# Patient Record
Sex: MALE | Race: BLACK OR AFRICAN AMERICAN | NOT HISPANIC OR LATINO | URBAN - METROPOLITAN AREA
[De-identification: names, ages, dates, MRNs, and addresses within clinical notes are randomized per-mention and may not be internally consistent; named-entity substitution may affect disease eponyms.]

---

## 2020-02-19 ENCOUNTER — OUTPATIENT (OUTPATIENT)
Dept: OUTPATIENT SERVICES | Facility: HOSPITAL | Age: 75
LOS: 1 days | End: 2020-02-19
Payer: MEDICARE

## 2020-02-19 VITALS
OXYGEN SATURATION: 97 % | DIASTOLIC BLOOD PRESSURE: 75 MMHG | WEIGHT: 242.29 LBS | TEMPERATURE: 98 F | HEIGHT: 72 IN | SYSTOLIC BLOOD PRESSURE: 159 MMHG | HEART RATE: 56 BPM | RESPIRATION RATE: 14 BRPM

## 2020-02-19 DIAGNOSIS — Z96.641 PRESENCE OF RIGHT ARTIFICIAL HIP JOINT: Chronic | ICD-10-CM

## 2020-02-19 DIAGNOSIS — I10 ESSENTIAL (PRIMARY) HYPERTENSION: ICD-10-CM

## 2020-02-19 DIAGNOSIS — Z29.9 ENCOUNTER FOR PROPHYLACTIC MEASURES, UNSPECIFIED: ICD-10-CM

## 2020-02-19 DIAGNOSIS — Z01.818 ENCOUNTER FOR OTHER PREPROCEDURAL EXAMINATION: ICD-10-CM

## 2020-02-19 DIAGNOSIS — I65.23 OCCLUSION AND STENOSIS OF BILATERAL CAROTID ARTERIES: ICD-10-CM

## 2020-02-19 DIAGNOSIS — Z96.642 PRESENCE OF LEFT ARTIFICIAL HIP JOINT: Chronic | ICD-10-CM

## 2020-02-19 DIAGNOSIS — I65.22 OCCLUSION AND STENOSIS OF LEFT CAROTID ARTERY: ICD-10-CM

## 2020-02-19 LAB
ANION GAP SERPL CALC-SCNC: 13 MMOL/L — SIGNIFICANT CHANGE UP (ref 5–17)
BLD GP AB SCN SERPL QL: NEGATIVE — SIGNIFICANT CHANGE UP
BUN SERPL-MCNC: 14 MG/DL — SIGNIFICANT CHANGE UP (ref 7–23)
CALCIUM SERPL-MCNC: 9.5 MG/DL — SIGNIFICANT CHANGE UP (ref 8.4–10.5)
CHLORIDE SERPL-SCNC: 102 MMOL/L — SIGNIFICANT CHANGE UP (ref 96–108)
CO2 SERPL-SCNC: 25 MMOL/L — SIGNIFICANT CHANGE UP (ref 22–31)
CREAT SERPL-MCNC: 1.01 MG/DL — SIGNIFICANT CHANGE UP (ref 0.5–1.3)
GLUCOSE SERPL-MCNC: 100 MG/DL — HIGH (ref 70–99)
HCT VFR BLD CALC: 39.8 % — SIGNIFICANT CHANGE UP (ref 39–50)
HGB BLD-MCNC: 12.8 G/DL — LOW (ref 13–17)
MCHC RBC-ENTMCNC: 28 PG — SIGNIFICANT CHANGE UP (ref 27–34)
MCHC RBC-ENTMCNC: 32.2 GM/DL — SIGNIFICANT CHANGE UP (ref 32–36)
MCV RBC AUTO: 87.1 FL — SIGNIFICANT CHANGE UP (ref 80–100)
NRBC # BLD: 0 /100 WBCS — SIGNIFICANT CHANGE UP (ref 0–0)
PLATELET # BLD AUTO: 198 K/UL — SIGNIFICANT CHANGE UP (ref 150–400)
POTASSIUM SERPL-MCNC: 4.5 MMOL/L — SIGNIFICANT CHANGE UP (ref 3.5–5.3)
POTASSIUM SERPL-SCNC: 4.5 MMOL/L — SIGNIFICANT CHANGE UP (ref 3.5–5.3)
RBC # BLD: 4.57 M/UL — SIGNIFICANT CHANGE UP (ref 4.2–5.8)
RBC # FLD: 13.6 % — SIGNIFICANT CHANGE UP (ref 10.3–14.5)
RH IG SCN BLD-IMP: POSITIVE — SIGNIFICANT CHANGE UP
SODIUM SERPL-SCNC: 140 MMOL/L — SIGNIFICANT CHANGE UP (ref 135–145)
WBC # BLD: 10.2 K/UL — SIGNIFICANT CHANGE UP (ref 3.8–10.5)
WBC # FLD AUTO: 10.2 K/UL — SIGNIFICANT CHANGE UP (ref 3.8–10.5)

## 2020-02-19 PROCEDURE — 86901 BLOOD TYPING SEROLOGIC RH(D): CPT

## 2020-02-19 PROCEDURE — G0463: CPT

## 2020-02-19 PROCEDURE — 80048 BASIC METABOLIC PNL TOTAL CA: CPT

## 2020-02-19 PROCEDURE — 86900 BLOOD TYPING SEROLOGIC ABO: CPT

## 2020-02-19 PROCEDURE — 85027 COMPLETE CBC AUTOMATED: CPT

## 2020-02-19 PROCEDURE — 86850 RBC ANTIBODY SCREEN: CPT

## 2020-02-19 NOTE — H&P PST ADULT - RS GEN PE MLT RESP DETAILS PC
breath sounds equal/no wheezes/no rhonchi/no rales/clear to auscultation bilaterally/good air movement/respirations non-labored/airway patent

## 2020-02-19 NOTE — H&P PST ADULT - ASSESSMENT
CAPRINI SCORE [CLOT updated 18]    AGE RELATED RISK FACTORS                                                       MOBILITY RELATED FACTORS  [ ] Age 41-60 years                                            (1 Point)                    [ ] Bed rest                                                        (1 Point)  [ ] Age: 61-74 years                                           (2 Points)                  [ ] Plaster cast                                                   (2 Points)  [ ] Age= 75 years                                              (3 Points)                    [ ] Bed bound for more than 72 hours                 (2 Points)    DISEASE RELATED RISK FACTORS                                               GENDER SPECIFIC FACTORS  [ ] Edema in the lower extremities                       (1 Point)              [ ] Pregnancy                                                     (1 Point)  [ ] Varicose veins                                               (1 Point)                     [ ] Post-partum < 6 weeks                                   (1 Point)             [ ] BMI > 25 Kg/m2                                            (1 Point)                     [ ] Hormonal therapy  or oral contraception          (1 Point)                 [ ] Sepsis (in the previous month)                        (1 Point)               [ ] History of pregnancy complications                 (1 point)  [ ] Pneumonia or serious lung disease                                               [ ] Unexplained or recurrent                     (1 Point)           (in the previous month)                               (1 Point)  [ ] Abnormal pulmonary function test                     (1 Point)                 SURGERY RELATED RISK FACTORS  [ ] Acute myocardial infarction                              (1 Point)               [ ]  Section                                             (1 Point)  [ ] Congestive heart failure (in the previous month)  (1 Point)      [ ] Minor surgery                                                  (1 Point)   [ ] Inflammatory bowel disease                             (1 Point)               [ ] Arthroscopic surgery                                        (2 Points)  [ ] Central venous access                                      (2 Points)                [ ] General surgery lasting more than 45 minutes (2 points)  [ ] Present or previous malignancy                     (2 Points)                [ ] Elective arthroplasty                                         (5 points)    [ ] Stroke (in the previous month)                          (5 Points)                                                                                                                                                           HEMATOLOGY RELATED FACTORS                                                 TRAUMA RELATED RISK FACTORS  [ ] Prior episodes of VTE                                     (3 Points)                [ ] Fracture of the hip, pelvis, or leg                       (5 Points)  [ ] Positive family history for VTE                         (3 Points)             [ ] Acute spinal cord injury (in the previous month)  (5 Points)  [ ] Prothrombin 27586 A                                     (3 Points)               [ ] Paralysis  (less than 1 month)                             (5 Points)  [ ] Factor V Leiden                                             (3 Points)                  [ ] Multiple Trauma within 1 month                        (5 Points)  [ ] Lupus anticoagulants                                     (3 Points)                                                           [ ] Anticardiolipin antibodies                               (3 Points)                                                       [ ] High homocysteine in the blood                      (3 Points)                                             [ ] Other congenital or acquired thrombophilia      (3 Points)                                                [ ] Heparin induced thrombocytopenia                  (3 Points)                                     Total Score [     6     ]

## 2020-02-19 NOTE — H&P PST ADULT - NSANTHOSAYNRD_GEN_A_CORE
No. RANDY screening performed.  STOP BANG Legend: 0-2 = LOW Risk; 3-4 = INTERMEDIATE Risk; 5-8 = HIGH Risk/17 in

## 2020-02-19 NOTE — H&P PST ADULT - NSICDXPASTSURGICALHX_GEN_ALL_CORE_FT
PAST SURGICAL HISTORY:  S/P hip replacement, left 2014    S/P hip replacement, right 2005, revision in 2018

## 2020-02-19 NOTE — H&P PST ADULT - NEGATIVE NEUROLOGICAL SYMPTOMS
no paresthesias/no loss of sensation/no syncope/no vertigo/no headache/no difficulty walking/no weakness

## 2020-02-19 NOTE — H&P PST ADULT - NEGATIVE ENMT SYMPTOMS
no nasal congestion/no hearing difficulty/no ear pain/no vertigo/no throat pain/no dysphagia/no nasal discharge

## 2020-02-19 NOTE — H&P PST ADULT - HISTORY OF PRESENT ILLNESS
74 year old male with PMH HTN, goiter right side: TFT normal as per patient, reports 90% left carotid stenosis, found on serial carotid dopplers. Pt is asymptomatic and denies CVA/TIA history. Scheduled left carotid endarterectomy on 2/26/2020.

## 2020-02-19 NOTE — H&P PST ADULT - ATTENDING COMMENTS
We plan left CEA for high grade stenosis.  All risks and alternatives were reiterated to the Dottins who agree with this plan.

## 2020-02-19 NOTE — H&P PST ADULT - NSICDXPASTMEDICALHX_GEN_ALL_CORE_FT
PAST MEDICAL HISTORY:  Alcohol abuse sober 22 years: was in inpatient rehab    Carotid artery stenosis     HLD (hyperlipidemia)     HTN (hypertension)     Thyroid goiter right side; normal TFTs as per patient  followed by Dr. Carrera

## 2020-02-26 ENCOUNTER — INPATIENT (INPATIENT)
Facility: HOSPITAL | Age: 75
LOS: 1 days | Discharge: HOME | DRG: 39 | End: 2020-02-28
Attending: SURGERY | Admitting: SURGERY
Payer: MEDICARE

## 2020-02-26 VITALS
OXYGEN SATURATION: 96 % | TEMPERATURE: 98 F | WEIGHT: 242.29 LBS | HEIGHT: 72 IN | RESPIRATION RATE: 16 BRPM | DIASTOLIC BLOOD PRESSURE: 73 MMHG | HEART RATE: 65 BPM | SYSTOLIC BLOOD PRESSURE: 152 MMHG

## 2020-02-26 DIAGNOSIS — Z96.642 PRESENCE OF LEFT ARTIFICIAL HIP JOINT: Chronic | ICD-10-CM

## 2020-02-26 DIAGNOSIS — I65.23 OCCLUSION AND STENOSIS OF BILATERAL CAROTID ARTERIES: ICD-10-CM

## 2020-02-26 DIAGNOSIS — Z96.641 PRESENCE OF RIGHT ARTIFICIAL HIP JOINT: Chronic | ICD-10-CM

## 2020-02-26 PROCEDURE — 88311 DECALCIFY TISSUE: CPT | Mod: 26

## 2020-02-26 PROCEDURE — 88304 TISSUE EXAM BY PATHOLOGIST: CPT | Mod: 26

## 2020-02-26 RX ORDER — HYDRALAZINE HCL 50 MG
25 TABLET ORAL DAILY
Refills: 0 | Status: DISCONTINUED | OUTPATIENT
Start: 2020-02-26 | End: 2020-02-28

## 2020-02-26 RX ORDER — METOPROLOL TARTRATE 50 MG
100 TABLET ORAL
Qty: 0 | Refills: 0 | DISCHARGE

## 2020-02-26 RX ORDER — SODIUM CHLORIDE 9 MG/ML
3 INJECTION INTRAMUSCULAR; INTRAVENOUS; SUBCUTANEOUS EVERY 8 HOURS
Refills: 0 | Status: DISCONTINUED | OUTPATIENT
Start: 2020-02-26 | End: 2020-02-26

## 2020-02-26 RX ORDER — ATORVASTATIN CALCIUM 80 MG/1
80 TABLET, FILM COATED ORAL AT BEDTIME
Refills: 0 | Status: DISCONTINUED | OUTPATIENT
Start: 2020-02-26 | End: 2020-02-28

## 2020-02-26 RX ORDER — SODIUM CHLORIDE 9 MG/ML
1000 INJECTION, SOLUTION INTRAVENOUS
Refills: 0 | Status: DISCONTINUED | OUTPATIENT
Start: 2020-02-26 | End: 2020-02-26

## 2020-02-26 RX ORDER — CEFAZOLIN SODIUM 1 G
2000 VIAL (EA) INJECTION ONCE
Refills: 0 | Status: DISCONTINUED | OUTPATIENT
Start: 2020-02-26 | End: 2020-02-26

## 2020-02-26 RX ORDER — ENOXAPARIN SODIUM 100 MG/ML
40 INJECTION SUBCUTANEOUS DAILY
Refills: 0 | Status: DISCONTINUED | OUTPATIENT
Start: 2020-02-26 | End: 2020-02-28

## 2020-02-26 RX ORDER — ROSUVASTATIN CALCIUM 5 MG/1
1 TABLET ORAL
Qty: 0 | Refills: 0 | DISCHARGE

## 2020-02-26 RX ORDER — ACETAMINOPHEN 500 MG
975 TABLET ORAL EVERY 6 HOURS
Refills: 0 | Status: DISCONTINUED | OUTPATIENT
Start: 2020-02-26 | End: 2020-02-28

## 2020-02-26 RX ORDER — AMLODIPINE BESYLATE 2.5 MG/1
10 TABLET ORAL DAILY
Refills: 0 | Status: DISCONTINUED | OUTPATIENT
Start: 2020-02-26 | End: 2020-02-28

## 2020-02-26 RX ORDER — LIDOCAINE HCL 20 MG/ML
0.2 VIAL (ML) INJECTION ONCE
Refills: 0 | Status: DISCONTINUED | OUTPATIENT
Start: 2020-02-26 | End: 2020-02-26

## 2020-02-26 RX ORDER — OXYCODONE HYDROCHLORIDE 5 MG/1
5 TABLET ORAL EVERY 6 HOURS
Refills: 0 | Status: DISCONTINUED | OUTPATIENT
Start: 2020-02-26 | End: 2020-02-28

## 2020-02-26 RX ORDER — METOPROLOL TARTRATE 50 MG
100 TABLET ORAL DAILY
Refills: 0 | Status: DISCONTINUED | OUTPATIENT
Start: 2020-02-26 | End: 2020-02-26

## 2020-02-26 RX ORDER — METOPROLOL TARTRATE 50 MG
100 TABLET ORAL DAILY
Refills: 0 | Status: DISCONTINUED | OUTPATIENT
Start: 2020-02-26 | End: 2020-02-28

## 2020-02-26 RX ORDER — AMLODIPINE BESYLATE 2.5 MG/1
1 TABLET ORAL
Qty: 0 | Refills: 0 | DISCHARGE

## 2020-02-26 RX ORDER — ASPIRIN/CALCIUM CARB/MAGNESIUM 324 MG
1 TABLET ORAL
Qty: 0 | Refills: 0 | DISCHARGE

## 2020-02-26 RX ORDER — ASPIRIN/CALCIUM CARB/MAGNESIUM 324 MG
81 TABLET ORAL DAILY
Refills: 0 | Status: DISCONTINUED | OUTPATIENT
Start: 2020-02-26 | End: 2020-02-28

## 2020-02-26 RX ORDER — HYDRALAZINE HCL 50 MG
1 TABLET ORAL
Qty: 0 | Refills: 0 | DISCHARGE

## 2020-02-26 RX ADMIN — Medication 975 MILLIGRAM(S): at 19:48

## 2020-02-26 RX ADMIN — ATORVASTATIN CALCIUM 80 MILLIGRAM(S): 80 TABLET, FILM COATED ORAL at 21:56

## 2020-02-26 RX ADMIN — SODIUM CHLORIDE 100 MILLILITER(S): 9 INJECTION, SOLUTION INTRAVENOUS at 11:00

## 2020-02-26 RX ADMIN — Medication 975 MILLIGRAM(S): at 19:18

## 2020-02-26 RX ADMIN — ENOXAPARIN SODIUM 40 MILLIGRAM(S): 100 INJECTION SUBCUTANEOUS at 12:14

## 2020-02-26 NOTE — CHART NOTE - NSCHARTNOTEFT_GEN_A_CORE
POST-OPERATIVE NOTE    Subjective:  Patient is s/p L CEA.  Recovering appropriately. Pain well controlled, tolerating CLD. Denies any headache or vision changes.     Vital Signs Last 24 Hrs  T(C): 36.7 (26 Feb 2020 10:00), Max: 36.8 (26 Feb 2020 05:51)  T(F): 98.1 (26 Feb 2020 10:00), Max: 98.2 (26 Feb 2020 05:51)  HR: 57 (26 Feb 2020 13:30) (57 - 66)  BP: 133/70 (26 Feb 2020 13:30) (121/60 - 152/73)  BP(mean): 96 (26 Feb 2020 13:30) (84 - 114)  RR: 12 (26 Feb 2020 13:30) (12 - 17)  SpO2: 93% (26 Feb 2020 13:30) (93% - 100%)  I&O's Detail    26 Feb 2020 07:01  -  26 Feb 2020 14:13  --------------------------------------------------------  IN:    lactated ringers.: 300 mL  Total IN: 300 mL    OUT:    Voided: 100 mL  Total OUT: 100 mL    Total NET: 200 mL        amLODIPine   Tablet 10  aspirin enteric coated 81  enoxaparin Injectable 40  hydrALAZINE 25  metoprolol succinate     PAST MEDICAL & SURGICAL HISTORY:  Carotid artery stenosis  HLD (hyperlipidemia)  Alcohol abuse: sober 22 years: was in inpatient rehab  Thyroid goiter: right side; normal TFTs as per patient  followed by Dr. Carrera  HTN (hypertension)  S/P hip replacement, left: 2014  S/P hip replacement, right: 2005, revision in 2018        Physical Exam:  General: NAD, resting comfortably in bed  Neuro: Grossly intact, no deficits   Neck: Dressing with strikethrough c/d/i  Pulmonary: Nonlabored breathing, no respiratory distress  Cardiovascular: NSR  Abdominal: soft, NT/ND  Extremities: WWP, Moves all 4 spontaneously       LABS:            CAPILLARY BLOOD GLUCOSE          Radiology and Additional Studies:    Assessment:  The patient is a 74y Male who is now several hours post-op from a L CEA    Plan:  - Pain control as needed  - CLD advance as tolerated  - BP goal 110-160  - 8 hour PACU stay  - DVT ppx  - ASA  - OOB and ambulating as tolerated  - F/u AM labs

## 2020-02-26 NOTE — BRIEF OPERATIVE NOTE - OPERATION/FINDINGS
eversion endarterectomy performed  protamine given for reversal of heparin  EEG and SSEPs stable throughout case  moving all four extremities at end of case

## 2020-02-27 LAB
ANION GAP SERPL CALC-SCNC: 12 MMOL/L — SIGNIFICANT CHANGE UP (ref 5–17)
BUN SERPL-MCNC: 17 MG/DL — SIGNIFICANT CHANGE UP (ref 7–23)
CALCIUM SERPL-MCNC: 8.6 MG/DL — SIGNIFICANT CHANGE UP (ref 8.4–10.5)
CHLORIDE SERPL-SCNC: 101 MMOL/L — SIGNIFICANT CHANGE UP (ref 96–108)
CHOLEST SERPL-MCNC: 125 MG/DL — SIGNIFICANT CHANGE UP (ref 10–199)
CO2 SERPL-SCNC: 24 MMOL/L — SIGNIFICANT CHANGE UP (ref 22–31)
CREAT SERPL-MCNC: 1.15 MG/DL — SIGNIFICANT CHANGE UP (ref 0.5–1.3)
GLUCOSE SERPL-MCNC: 179 MG/DL — HIGH (ref 70–99)
HBA1C BLD-MCNC: 6.6 % — HIGH (ref 4–5.6)
HCT VFR BLD CALC: 36.2 % — LOW (ref 39–50)
HDLC SERPL-MCNC: 44 MG/DL — SIGNIFICANT CHANGE UP
HGB BLD-MCNC: 11.4 G/DL — LOW (ref 13–17)
LIPID PNL WITH DIRECT LDL SERPL: 65 MG/DL — SIGNIFICANT CHANGE UP
MAGNESIUM SERPL-MCNC: 2 MG/DL — SIGNIFICANT CHANGE UP (ref 1.6–2.6)
MCHC RBC-ENTMCNC: 27.3 PG — SIGNIFICANT CHANGE UP (ref 27–34)
MCHC RBC-ENTMCNC: 31.5 GM/DL — LOW (ref 32–36)
MCV RBC AUTO: 86.6 FL — SIGNIFICANT CHANGE UP (ref 80–100)
NRBC # BLD: 0 /100 WBCS — SIGNIFICANT CHANGE UP (ref 0–0)
PHOSPHATE SERPL-MCNC: 2.9 MG/DL — SIGNIFICANT CHANGE UP (ref 2.5–4.5)
PLATELET # BLD AUTO: 190 K/UL — SIGNIFICANT CHANGE UP (ref 150–400)
POTASSIUM SERPL-MCNC: 4.5 MMOL/L — SIGNIFICANT CHANGE UP (ref 3.5–5.3)
POTASSIUM SERPL-SCNC: 4.5 MMOL/L — SIGNIFICANT CHANGE UP (ref 3.5–5.3)
RBC # BLD: 4.18 M/UL — LOW (ref 4.2–5.8)
RBC # FLD: 13.5 % — SIGNIFICANT CHANGE UP (ref 10.3–14.5)
SODIUM SERPL-SCNC: 137 MMOL/L — SIGNIFICANT CHANGE UP (ref 135–145)
TOTAL CHOLESTEROL/HDL RATIO MEASUREMENT: 2.9 RATIO — LOW (ref 3.4–9.6)
TRIGL SERPL-MCNC: 81 MG/DL — SIGNIFICANT CHANGE UP (ref 10–149)
WBC # BLD: 14.16 K/UL — HIGH (ref 3.8–10.5)
WBC # FLD AUTO: 14.16 K/UL — HIGH (ref 3.8–10.5)

## 2020-02-27 RX ORDER — ACETAMINOPHEN 500 MG
3 TABLET ORAL
Qty: 0 | Refills: 0 | DISCHARGE
Start: 2020-02-27

## 2020-02-27 RX ORDER — OXYCODONE HYDROCHLORIDE 5 MG/1
1 TABLET ORAL
Qty: 12 | Refills: 0
Start: 2020-02-27 | End: 2020-02-29

## 2020-02-27 RX ADMIN — ATORVASTATIN CALCIUM 80 MILLIGRAM(S): 80 TABLET, FILM COATED ORAL at 22:26

## 2020-02-27 RX ADMIN — Medication 975 MILLIGRAM(S): at 12:51

## 2020-02-27 RX ADMIN — ENOXAPARIN SODIUM 40 MILLIGRAM(S): 100 INJECTION SUBCUTANEOUS at 12:50

## 2020-02-27 RX ADMIN — Medication 25 MILLIGRAM(S): at 06:27

## 2020-02-27 RX ADMIN — Medication 975 MILLIGRAM(S): at 22:56

## 2020-02-27 RX ADMIN — Medication 975 MILLIGRAM(S): at 04:49

## 2020-02-27 RX ADMIN — AMLODIPINE BESYLATE 10 MILLIGRAM(S): 2.5 TABLET ORAL at 06:27

## 2020-02-27 RX ADMIN — Medication 100 MILLIGRAM(S): at 06:27

## 2020-02-27 RX ADMIN — Medication 975 MILLIGRAM(S): at 04:19

## 2020-02-27 RX ADMIN — Medication 81 MILLIGRAM(S): at 12:50

## 2020-02-27 RX ADMIN — Medication 975 MILLIGRAM(S): at 22:26

## 2020-02-27 NOTE — CONSULT NOTE ADULT - ASSESSMENT
74 year old male with PMH HTN, goiter right side: TFT normal as per patient, reports 90% left carotid stenosis, found on serial carotid dopplers. Pt is asymptomatic and denies CVA/TIA history.   sp left carotid endarterectomy on 2/26/2020.       #sp carotid endarterectomy  plan per vascular team  cont asa    #dizziness  monitor on tele  close vital monitoring  gentle iv hydration    #HTN: optimally controlled  resume current meds with parameters    #HLD-statin    DVT ppx-lovenox    Will update Dr Goldberg    [facility]  [phone]

## 2020-02-27 NOTE — PHYSICAL THERAPY INITIAL EVALUATION ADULT - CRITERIA FOR SKILLED THERAPEUTIC INTERVENTIONS
risk reduction/prevention/therapy frequency/functional limitations in following categories/impairments found/anticipated discharge recommendation/rehab potential/predicted duration of therapy intervention

## 2020-02-27 NOTE — DISCHARGE NOTE PROVIDER - HOSPITAL COURSE
74 year old male with PMH HTN, goiter right side: TFT normal as per patient, reports 90% left carotid stenosis, found on serial carotid dopplers. Pt is asymptomatic and denies CVA/TIA history. Pt. scheduled left carotid endarterectomy on 2/26/2020.         Pt. tolerated procedure well and was transferred to the recovery room where pt was closely managed for postoperative pain and blood pressure control, and then transferred to the floor without incidence.  Pt. was mainly managed for postoperative pain, wound care, as well as close monitoring of fluid resuscitation, neurological status and electrolyte repletion.  Pt has been tolerating a diet, voiding, ambulating, and the pain is now well controlled.   Pt. is ready for discharge home in stable condition, and will follow up in two weeks as an outpatient with Dr. Parker.

## 2020-02-27 NOTE — DISCHARGE NOTE PROVIDER - NSDCFUADDINST_GEN_ALL_CORE_FT
Please keep incision sites clean and dry, shower only.  Do not bathe or immerse incision sites in water for a prolonged amount of time.  Steri-strips may fall of on their own in the shower.

## 2020-02-27 NOTE — PHYSICAL THERAPY INITIAL EVALUATION ADULT - PLANNED THERAPY INTERVENTIONS, PT EVAL
stair training up anddown 14 steps / independent/balance training/bed mobility training/gait training/transfer training

## 2020-02-27 NOTE — DISCHARGE NOTE PROVIDER - NSDCCPCAREPLAN_GEN_ALL_CORE_FT
PRINCIPAL DISCHARGE DIAGNOSIS  Diagnosis: Occlusion and stenosis of left carotid artery  Assessment and Plan of Treatment: Follow up with Dr. Salamanca, Vascular Surgery, within 7 to 10 days.  Call [phone] for appointment.  Return to ER for temperatures greater than 101, chills sweats, pain not controlled with pain medications, persistent headaches, nausea and/or vomiting, asymmetrical weakness, neurological or mental status changes.  Please keep incision sites clean and dry, shower only.  Do not bathe or immerse incision sites in water for a prolonged amount of time.  May remove gauze dressing in 24hrs.  Steri-strips may fall of on their own in the shower.        SECONDARY DISCHARGE DIAGNOSES  Diagnosis: HTN (hypertension)  Assessment and Plan of Treatment: Please follow up with your Primary Care Physician regarding your hospitalization, and schedule an appointment within two weeks after your discharge to review your hospital course.  Please  review all your current medications, and dose adjust as prescribed by your Primary Care Physician.  Call their office for appointment.

## 2020-02-27 NOTE — DISCHARGE NOTE NURSING/CASE MANAGEMENT/SOCIAL WORK - PATIENT PORTAL LINK FT
You can access the FollowMyHealth Patient Portal offered by [facility] by registering at the following website: [email or web address]. By joining [facility]’s FollowMyHealth portal, you will also be able to view your health information using other applications (apps) compatible with our system.

## 2020-02-27 NOTE — PROGRESS NOTE ADULT - ASSESSMENT
Assessment:  The patient is a 74y Male POD #1 s/p L CEA    Plan:  - Pain control as needed  - Reg diet  - BP goal 110-160  - DVT ppx  - ASA  - OOB and ambulating as tolerated  - F/u AM labs.  - DC later today    [id number]

## 2020-02-27 NOTE — DISCHARGE NOTE PROVIDER - CARE PROVIDER_API CALL
Julio Salamanca)  Vascular Surgery  [address] [facility] [address] [place] [address]  Phone: [phone]  Fax: [phone]  Follow Up Time:

## 2020-02-27 NOTE — DISCHARGE NOTE PROVIDER - NSDCMRMEDTOKEN_GEN_ALL_CORE_FT
acetaminophen 325 mg oral tablet: 3 tab(s) orally every 6 hours, As needed, Mild Pain (1 - 3), Moderate Pain (4 - 6)  amLODIPine 10 mg oral tablet: 1 tab(s) orally once a day  aspirin 81 mg oral tablet: 1 tab(s) orally once a day  hydrALAZINE 25 mg oral tablet: 1 milligram(s) orally once a day  metoprolol tartrate 100 mg oral tablet: 100 milligram(s) orally once a day  oxyCODONE 5 mg oral tablet: 1-2  tab(s) orally every 6-8  hours, As needed, Moderate to Severe Pain MDD:4  rosuvastatin 20 mg oral tablet: 1 tab(s) orally once a day

## 2020-02-27 NOTE — PROGRESS NOTE ADULT - SUBJECTIVE AND OBJECTIVE BOX
S: Pt seen and examined. No acute events O/N. no c/o's. Denies HA, asymmetrical weakness nor changes in sensation in any of the of extremities. Denies any dizzyness nor lightheadedness. Denies N/V/ reports tolerating regular diet. Denies F/C/NS.  Denies CP/SOB/dyspnea/palpitations.      MEDICATIONS  (STANDING):  amLODIPine   Tablet 10 milliGRAM(s) Oral daily  aspirin enteric coated 81 milliGRAM(s) Oral daily  atorvastatin 80 milliGRAM(s) Oral at bedtime  enoxaparin Injectable 40 milliGRAM(s) SubCutaneous daily  hydrALAZINE 25 milliGRAM(s) Oral daily  metoprolol succinate  milliGRAM(s) Oral daily    MEDICATIONS  (PRN):  acetaminophen   Tablet .. 975 milliGRAM(s) Oral every 6 hours PRN Mild Pain (1 - 3), Moderate Pain (4 - 6)  oxyCODONE    IR 5 milliGRAM(s) Oral every 6 hours PRN Severe Pain (7 - 10)      LABS:                        11.4   14.16 )-----------( 190      ( 27 Feb 2020 07:17 )             36.2                   Vital Signs Last 24 Hrs  T(C): 36.8 (27 Feb 2020 05:30), Max: 36.9 (26 Feb 2020 21:50)  T(F): 98.2 (27 Feb 2020 05:30), Max: 98.4 (26 Feb 2020 21:50)  HR: 65 (27 Feb 2020 05:30) (53 - 69)  BP: 129/68 (27 Feb 2020 05:30) (121/60 - 160/80)  BP(mean): 110 (26 Feb 2020 18:00) (84 - 114)  RR: 18 (27 Feb 2020 05:30) (12 - 18)  SpO2: 95% (27 Feb 2020 05:30) (92% - 100%)      I&O's Summary    26 Feb 2020 07:01  -  27 Feb 2020 07:00  --------------------------------------------------------  IN: 1510 mL / OUT: 1350 mL / NET: 160 mL      I&O's Detail    26 Feb 2020 07:01  -  27 Feb 2020 07:00  --------------------------------------------------------  IN:    lactated ringers.: 700 mL    Oral Fluid: 810 mL  Total IN: 1510 mL    OUT:    Voided: 1350 mL  Total OUT: 1350 mL    Total NET: 160 mL      Physical Exam:    General: NAD, resting comfortably in bed  HEENT: NCAT PERRLA,EOMI no tongue deviation, symmetrical facies and smile. Shrugs shoulders equally  Neuro: Grossly intact, no deficits   Neck: Dressing with strikethrough c/d/i  Chest: Nonlabored breathing, no respiratory distress  Abdominal: soft, NT/ND  Extremities: WWP, Moves all 4 spontaneously good muscle strength equally all extrem, no noted weakness.    .  .  .  .

## 2020-02-27 NOTE — CONSULT NOTE ADULT - SUBJECTIVE AND OBJECTIVE BOX
Patient is a 74y old  Male who presents with a chief complaint of Left Carotid Endarterectomy (19 Feb 2020 14:48)      HPI:  74 year old male with PMH HTN, goiter right side: TFT normal as per patient, reports 90% left carotid stenosis, found on serial carotid dopplers. Pt is asymptomatic and denies CVA/TIA history.   sp left carotid endarterectomy on 2/26/2020.     this am pt c/o dizziness, denies cp/sob, not orthostatic      PAST MEDICAL & SURGICAL HISTORY:  Carotid artery stenosis  HLD (hyperlipidemia)  Alcohol abuse: sober 22 years: was in inpatient rehab  Thyroid goiter: right side; normal TFTs as per patient  followed by Dr. Carrera  HTN (hypertension)  S/P hip replacement, left: 2014  S/P hip replacement, right: 2005, revision in 2018      FAMILY HISTORY:      SOCIAL HISTORY:    Allergies    No Known Allergies    Intolerances          REVIEW OF SYSEMS:  General: no weakness, no fever/chills, no weight loss/gain  Skin/Breast: no rash, no jaundice  Ophthalmologic: no vision changes, no dry eyes   Respiratory and Thorax: no cough, no wheezing, no hemoptysis, no dyspnea  Cardiovascular: no chest pain, no shortness of breath, no orthopnea  Gastrointestinal: no n/v/d, no abdominal pain, no dysphagia   Genitourinary: no dysuria, no frequency, no nocturia, no hematuria  Musculoskeletal: no trauma, no sprain/strain, no myalgias, no arthralgias, no fracture  Neurological: no HA, no dizziness, no weakness, no numbness  Psychiatric: no depression, no SI/HI  Hematology/Lymphatics: no easy bruising  Endocrine: no heat or cold intolerance. no weight gain or loss  Allergic/Immunologic: no allergy or recent reaction       Vital Signs Last 24 Hrs  T(C): 37.1 (27 Feb 2020 21:00), Max: 37.1 (27 Feb 2020 21:00)  T(F): 98.8 (27 Feb 2020 21:00), Max: 98.8 (27 Feb 2020 21:00)  HR: 66 (27 Feb 2020 21:00) (64 - 68)  BP: 154/73 (27 Feb 2020 21:00) (129/68 - 154/73)  BP(mean): --  RR: 18 (27 Feb 2020 21:00) (18 - 18)  SpO2: 95% (27 Feb 2020 21:00) (95% - 96%)  I&O's Summary    26 Feb 2020 07:01  -  27 Feb 2020 07:00  --------------------------------------------------------  IN: 1590 mL / OUT: 1350 mL / NET: 240 mL    27 Feb 2020 07:01  -  27 Feb 2020 23:06  --------------------------------------------------------  IN: 840 mL / OUT: 1650 mL / NET: -810 mL        PHYSICAL EXAM:  GENERAL: NAD, Comfortable  HEAD:  Atraumatic, Normocephalic  EYES: EOMI, PERRLA, conjunctiva and sclera clear  NECK: Supple, No JVD, lt sided dressing  CHEST/LUNG: Clear to auscultation bilaterally; No wheeze  HEART: Regular rate and rhythm; No murmurs, rubs, or gallops  ABDOMEN: Soft, Nontender, Nondistended; Bowel sounds present  Neuro: AAOx3, no focal deficit, 5/5 b/l extremities  EXTREMITIES:  2+ Peripheral Pulses, No clubbing, cyanosis, or edema  SKIN: No rashes or lesions    LABS:                        11.4   14.16 )-----------( 190      ( 27 Feb 2020 07:17 )             36.2     02-27    137  |  101  |  17  ----------------------------<  179<H>  4.5   |  24  |  1.15    Ca    8.6      27 Feb 2020 07:13  Phos  2.9     02-27  Mg     2.0     02-27        CAPILLARY BLOOD GLUCOSE                RADIOLOGY & ADDITIONAL TESTS:    Imaging Personally Reviewed:  [x] YES  [ ] NO    Consultant(s) Notes Reviewed:  [x] YES  [ ] NO      MEDICATIONS  (STANDING):  amLODIPine   Tablet 10 milliGRAM(s) Oral daily  aspirin enteric coated 81 milliGRAM(s) Oral daily  atorvastatin 80 milliGRAM(s) Oral at bedtime  enoxaparin Injectable 40 milliGRAM(s) SubCutaneous daily  hydrALAZINE 25 milliGRAM(s) Oral daily  metoprolol succinate  milliGRAM(s) Oral daily    MEDICATIONS  (PRN):  acetaminophen   Tablet .. 975 milliGRAM(s) Oral every 6 hours PRN Mild Pain (1 - 3), Moderate Pain (4 - 6)  oxyCODONE    IR 5 milliGRAM(s) Oral every 6 hours PRN Severe Pain (7 - 10)      Care Discussed with Consultants/Other Providers [x] YES  [ ] NO    HEALTH ISSUES - PROBLEM Dx:  Occlusion and stenosis of left carotid artery  HTN (hypertension)  Need for prophylactic measure

## 2020-02-28 VITALS — OXYGEN SATURATION: 96 % | RESPIRATION RATE: 18 BRPM | TEMPERATURE: 98 F

## 2020-02-28 LAB
CHOLEST SERPL-MCNC: 136 MG/DL — SIGNIFICANT CHANGE UP (ref 10–199)
HBA1C BLD-MCNC: 6.7 % — HIGH (ref 4–5.6)
HDLC SERPL-MCNC: 46 MG/DL — SIGNIFICANT CHANGE UP
LIPID PNL WITH DIRECT LDL SERPL: 69 MG/DL — SIGNIFICANT CHANGE UP
TOTAL CHOLESTEROL/HDL RATIO MEASUREMENT: 2.9 RATIO — LOW (ref 3.4–9.6)
TRIGL SERPL-MCNC: 102 MG/DL — SIGNIFICANT CHANGE UP (ref 10–149)

## 2020-02-28 PROCEDURE — 80048 BASIC METABOLIC PNL TOTAL CA: CPT

## 2020-02-28 PROCEDURE — 80061 LIPID PANEL: CPT

## 2020-02-28 PROCEDURE — 86923 COMPATIBILITY TEST ELECTRIC: CPT

## 2020-02-28 PROCEDURE — 83036 HEMOGLOBIN GLYCOSYLATED A1C: CPT

## 2020-02-28 PROCEDURE — C1769: CPT

## 2020-02-28 PROCEDURE — 88304 TISSUE EXAM BY PATHOLOGIST: CPT

## 2020-02-28 PROCEDURE — 83735 ASSAY OF MAGNESIUM: CPT

## 2020-02-28 PROCEDURE — 97162 PT EVAL MOD COMPLEX 30 MIN: CPT

## 2020-02-28 PROCEDURE — C1889: CPT

## 2020-02-28 PROCEDURE — 85027 COMPLETE CBC AUTOMATED: CPT

## 2020-02-28 PROCEDURE — 84100 ASSAY OF PHOSPHORUS: CPT

## 2020-02-28 PROCEDURE — 88311 DECALCIFY TISSUE: CPT

## 2020-02-28 RX ADMIN — Medication 975 MILLIGRAM(S): at 06:19

## 2020-02-28 RX ADMIN — Medication 975 MILLIGRAM(S): at 06:49

## 2020-02-28 RX ADMIN — Medication 81 MILLIGRAM(S): at 11:17

## 2020-02-28 RX ADMIN — AMLODIPINE BESYLATE 10 MILLIGRAM(S): 2.5 TABLET ORAL at 06:18

## 2020-02-28 RX ADMIN — ENOXAPARIN SODIUM 40 MILLIGRAM(S): 100 INJECTION SUBCUTANEOUS at 11:17

## 2020-02-28 RX ADMIN — Medication 100 MILLIGRAM(S): at 06:18

## 2020-02-28 RX ADMIN — Medication 25 MILLIGRAM(S): at 06:18

## 2020-02-28 NOTE — PROGRESS NOTE ADULT - ASSESSMENT
Assessment:  The patient is a 74y Male POD #1 s/p L CEA    Plan:  - Pain control as needed  - Reg diet  - BP goal 110-160  - DVT ppx  - ASA  - OOB and ambulating as tolerated  - DC home today    [id number] Assessment:  The patient is a 74y Male POD #2 s/p L CEA    Plan:  - Pain control as needed  - Reg diet  - BP goal 110-160  - DVT ppx  - ASA  - OOB and ambulating as tolerated  - DC home today    [id number]

## 2020-02-28 NOTE — PROGRESS NOTE ADULT - ASSESSMENT
74 year old male with PMH HTN, goiter right side: TFT normal as per patient, reports 90% left carotid stenosis, found on serial carotid dopplers. Pt is asymptomatic and denies CVA/TIA history.   sp left carotid endarterectomy on 2/26/2020.       #sp carotid endarterectomy  plan per vascular team  cont asa    #dizziness resolved      #HTN: optimally controlled  resume current meds with parameters    #HLD-statin    DVT ppx-lovenox    Will update Dr Goldberg    [facility]  [phone]

## 2020-02-28 NOTE — PROGRESS NOTE ADULT - SUBJECTIVE AND OBJECTIVE BOX
S: Pt seen and examined. No acute events O/N. no c/o's. Denies HA, asymmetrical weakness nor changes in sensation in any of the of extremities. Denies any dizziness nor lightheadedness. Denies N/V/ reports tolerating regular diet. Denies F/C/NS.  Denies CP/SOB/dyspnea/palpitations.            Vital Signs:  Vital Signs Last 24 Hrs  T(C): 36.5 (28 Feb 2020 06:21), Max: 37.1 (27 Feb 2020 21:00)  T(F): 97.7 (28 Feb 2020 06:21), Max: 98.8 (27 Feb 2020 21:00)  HR: 67 (28 Feb 2020 06:21) (65 - 68)  BP: 168/78 (28 Feb 2020 06:21) (139/68 - 168/78)  BP(mean): --  RR: 16 (28 Feb 2020 06:21) (16 - 18)  SpO2: 95% (28 Feb 2020 06:21) (95% - 96%)    CAPILLARY BLOOD GLUCOSE          I&O's Detail    27 Feb 2020 07:01  -  28 Feb 2020 07:00  --------------------------------------------------------  IN:    Oral Fluid: 1320 mL  Total IN: 1320 mL    OUT:    Voided: 1975 mL  Total OUT: 1975 mL    Total NET: -655 mL          MEDICATIONS  (STANDING):  amLODIPine   Tablet 10 milliGRAM(s) Oral daily  aspirin enteric coated 81 milliGRAM(s) Oral daily  atorvastatin 80 milliGRAM(s) Oral at bedtime  enoxaparin Injectable 40 milliGRAM(s) SubCutaneous daily  hydrALAZINE 25 milliGRAM(s) Oral daily  metoprolol succinate  milliGRAM(s) Oral daily    MEDICATIONS  (PRN):  acetaminophen   Tablet .. 975 milliGRAM(s) Oral every 6 hours PRN Mild Pain (1 - 3), Moderate Pain (4 - 6)  oxyCODONE    IR 5 milliGRAM(s) Oral every 6 hours PRN Severe Pain (7 - 10)          Labs:    02-27    137  |  101  |  17  ----------------------------<  179<H>  4.5   |  24  |  1.15    Ca    8.6      27 Feb 2020 07:13  Phos  2.9     02-27  Mg     2.0     02-27                              11.4   14.16 )-----------( 190      ( 27 Feb 2020 07:17 )             36.2         Imaging:            Physical Exam:    General: NAD, resting comfortably in bed  HEENT: NCAT PERRLA,EOMI no tongue deviation, symmetrical facies and smile. Shrugs shoulders equally  Neuro: Grossly intact, no deficits   Neck: Dressing with strikethrough c/d/i  Chest: Nonlabored breathing, no respiratory distress  Abdominal: soft, NT/ND  Extremities: WWP, Moves all 4 spontaneously good muscle strength equally all extrem, no noted weakness.    .  .  .  . S: Pt seen and examined. No acute events O/N. no c/o's. Denies HA, asymmetrical weakness nor changes in sensation in any of the of extremities. Denies any dizziness nor lightheadedness. Denies N/V/ reports tolerating regular diet. Denies F/C/NS.  Denies CP/SOB/dyspnea/palpitations. Dressing removed on rounds            Vital Signs:  Vital Signs Last 24 Hrs  T(C): 36.5 (28 Feb 2020 06:21), Max: 37.1 (27 Feb 2020 21:00)  T(F): 97.7 (28 Feb 2020 06:21), Max: 98.8 (27 Feb 2020 21:00)  HR: 67 (28 Feb 2020 06:21) (65 - 68)  BP: 168/78 (28 Feb 2020 06:21) (139/68 - 168/78)  BP(mean): --  RR: 16 (28 Feb 2020 06:21) (16 - 18)  SpO2: 95% (28 Feb 2020 06:21) (95% - 96%)    CAPILLARY BLOOD GLUCOSE          I&O's Detail    27 Feb 2020 07:01  -  28 Feb 2020 07:00  --------------------------------------------------------  IN:    Oral Fluid: 1320 mL  Total IN: 1320 mL    OUT:    Voided: 1975 mL  Total OUT: 1975 mL    Total NET: -655 mL          MEDICATIONS  (STANDING):  amLODIPine   Tablet 10 milliGRAM(s) Oral daily  aspirin enteric coated 81 milliGRAM(s) Oral daily  atorvastatin 80 milliGRAM(s) Oral at bedtime  enoxaparin Injectable 40 milliGRAM(s) SubCutaneous daily  hydrALAZINE 25 milliGRAM(s) Oral daily  metoprolol succinate  milliGRAM(s) Oral daily    MEDICATIONS  (PRN):  acetaminophen   Tablet .. 975 milliGRAM(s) Oral every 6 hours PRN Mild Pain (1 - 3), Moderate Pain (4 - 6)  oxyCODONE    IR 5 milliGRAM(s) Oral every 6 hours PRN Severe Pain (7 - 10)          Labs:    02-27    137  |  101  |  17  ----------------------------<  179<H>  4.5   |  24  |  1.15    Ca    8.6      27 Feb 2020 07:13  Phos  2.9     02-27  Mg     2.0     02-27                              11.4   14.16 )-----------( 190      ( 27 Feb 2020 07:17 )             36.2         Imaging:            Physical Exam:    General: NAD, resting comfortably in bed  HEENT: NCAT PERRLA,EOMI no tongue deviation, symmetrical facies and smile. Shrugs shoulders equally  Neuro: Grossly intact, no deficits   Neck: Dressing with strikethrough c/d/i  Chest: Nonlabored breathing, no respiratory distress  Abdominal: soft, NT/ND  Extremities: WWP, Moves all 4 spontaneously good muscle strength equally all extrem, no noted weakness.    .  .  .  .

## 2023-03-17 NOTE — PHYSICAL THERAPY INITIAL EVALUATION ADULT - LIVES WITH, PROFILE
spouse Aklief counseling:  Patient advised to apply a pea-sized amount only at bedtime and wait 30 minutes after washing their face before applying.  If too drying, patient may add a non-comedogenic moisturizer.  The most commonly reported side effects including irritation, redness, scaling, dryness, stinging, burning, itching, and increased risk of sunburn.  The patient verbalized understanding of the proper use and possible adverse effects of retinoids.  All of the patient's questions and concerns were addressed.

## 2023-12-21 NOTE — H&P PST ADULT - NSICDXPROBLEM_GEN_ALL_CORE_FT
1
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 Surgical patients in this group will benefit from both pharmacologic prophylaxis and intermittent compression devices.  The surgical team will determine the balance between VTE risk and bleeding risk, and other clinical considerations

## 2024-03-19 NOTE — H&P PST ADULT - CARDIOVASCULAR SYMPTOMS
Head, normocephalic, atraumatic, Face, Face within normal limits, Ears, External ears within normal limits
B/L LE edema +1/peripheral edema

## 2025-02-28 PROBLEM — E78.5 HYPERLIPIDEMIA, UNSPECIFIED: Chronic | Status: ACTIVE | Noted: 2020-02-19

## 2025-02-28 PROBLEM — I65.29 OCCLUSION AND STENOSIS OF UNSPECIFIED CAROTID ARTERY: Chronic | Status: ACTIVE | Noted: 2020-02-19

## 2025-02-28 PROBLEM — F10.10 ALCOHOL ABUSE, UNCOMPLICATED: Chronic | Status: ACTIVE | Noted: 2020-02-19

## 2025-02-28 PROBLEM — E04.9 NONTOXIC GOITER, UNSPECIFIED: Chronic | Status: ACTIVE | Noted: 2020-02-19

## 2025-02-28 PROBLEM — I10 ESSENTIAL (PRIMARY) HYPERTENSION: Chronic | Status: ACTIVE | Noted: 2020-02-19

## 2025-03-02 ENCOUNTER — INPATIENT (INPATIENT)
Facility: HOSPITAL | Age: 80
LOS: 1 days | Discharge: HOME | DRG: 149 | End: 2025-03-04
Attending: PSYCHIATRY & NEUROLOGY | Admitting: PSYCHIATRY & NEUROLOGY
Payer: MEDICARE

## 2025-03-02 VITALS
DIASTOLIC BLOOD PRESSURE: 80 MMHG | RESPIRATION RATE: 20 BRPM | WEIGHT: 184.97 LBS | HEIGHT: 73 IN | SYSTOLIC BLOOD PRESSURE: 170 MMHG | TEMPERATURE: 98 F | OXYGEN SATURATION: 98 % | HEART RATE: 65 BPM

## 2025-03-02 DIAGNOSIS — Z96.641 PRESENCE OF RIGHT ARTIFICIAL HIP JOINT: Chronic | ICD-10-CM

## 2025-03-02 DIAGNOSIS — Z96.642 PRESENCE OF LEFT ARTIFICIAL HIP JOINT: Chronic | ICD-10-CM

## 2025-03-02 DIAGNOSIS — R42 DIZZINESS AND GIDDINESS: ICD-10-CM

## 2025-03-02 LAB
ALBUMIN SERPL ELPH-MCNC: 4.2 G/DL — SIGNIFICANT CHANGE UP (ref 3.3–5)
ALP SERPL-CCNC: 148 U/L — HIGH (ref 40–120)
ALT FLD-CCNC: 20 U/L — SIGNIFICANT CHANGE UP (ref 10–45)
ANION GAP SERPL CALC-SCNC: 16 MMOL/L — SIGNIFICANT CHANGE UP (ref 5–17)
APPEARANCE UR: CLEAR — SIGNIFICANT CHANGE UP
APTT BLD: 29.1 SEC — SIGNIFICANT CHANGE UP (ref 24.5–35.6)
AST SERPL-CCNC: 23 U/L — SIGNIFICANT CHANGE UP (ref 10–40)
BASOPHILS # BLD AUTO: 0.03 K/UL — SIGNIFICANT CHANGE UP (ref 0–0.2)
BASOPHILS NFR BLD AUTO: 0.4 % — SIGNIFICANT CHANGE UP (ref 0–2)
BILIRUB SERPL-MCNC: 0.3 MG/DL — SIGNIFICANT CHANGE UP (ref 0.2–1.2)
BILIRUB UR-MCNC: NEGATIVE — SIGNIFICANT CHANGE UP
BUN SERPL-MCNC: 17 MG/DL — SIGNIFICANT CHANGE UP (ref 7–23)
CALCIUM SERPL-MCNC: 10.1 MG/DL — SIGNIFICANT CHANGE UP (ref 8.4–10.5)
CHLORIDE SERPL-SCNC: 98 MMOL/L — SIGNIFICANT CHANGE UP (ref 96–108)
CO2 SERPL-SCNC: 22 MMOL/L — SIGNIFICANT CHANGE UP (ref 22–31)
COLOR SPEC: YELLOW — SIGNIFICANT CHANGE UP
CREAT SERPL-MCNC: 1.05 MG/DL — SIGNIFICANT CHANGE UP (ref 0.5–1.3)
DIFF PNL FLD: NEGATIVE — SIGNIFICANT CHANGE UP
EGFR: 73 ML/MIN/1.73M2 — SIGNIFICANT CHANGE UP
EGFR: 73 ML/MIN/1.73M2 — SIGNIFICANT CHANGE UP
EOSINOPHIL # BLD AUTO: 0.06 K/UL — SIGNIFICANT CHANGE UP (ref 0–0.5)
EOSINOPHIL NFR BLD AUTO: 0.8 % — SIGNIFICANT CHANGE UP (ref 0–6)
GAS PNL BLDV: SIGNIFICANT CHANGE UP
GLUCOSE BLDC GLUCOMTR-MCNC: 171 MG/DL — HIGH (ref 70–99)
GLUCOSE BLDC GLUCOMTR-MCNC: 244 MG/DL — HIGH (ref 70–99)
GLUCOSE SERPL-MCNC: 202 MG/DL — HIGH (ref 70–99)
GLUCOSE UR QL: NEGATIVE MG/DL — SIGNIFICANT CHANGE UP
HCT VFR BLD CALC: 40.4 % — SIGNIFICANT CHANGE UP (ref 39–50)
HGB BLD-MCNC: 13 G/DL — SIGNIFICANT CHANGE UP (ref 13–17)
IMM GRANULOCYTES NFR BLD AUTO: 0.3 % — SIGNIFICANT CHANGE UP (ref 0–0.9)
INR BLD: 1.11 RATIO — SIGNIFICANT CHANGE UP (ref 0.85–1.16)
KETONES UR-MCNC: NEGATIVE MG/DL — SIGNIFICANT CHANGE UP
LEUKOCYTE ESTERASE UR-ACNC: NEGATIVE — SIGNIFICANT CHANGE UP
LYMPHOCYTES # BLD AUTO: 1.39 K/UL — SIGNIFICANT CHANGE UP (ref 1–3.3)
LYMPHOCYTES # BLD AUTO: 17.9 % — SIGNIFICANT CHANGE UP (ref 13–44)
MCHC RBC-ENTMCNC: 28.1 PG — SIGNIFICANT CHANGE UP (ref 27–34)
MCHC RBC-ENTMCNC: 32.2 G/DL — SIGNIFICANT CHANGE UP (ref 32–36)
MCV RBC AUTO: 87.3 FL — SIGNIFICANT CHANGE UP (ref 80–100)
MONOCYTES # BLD AUTO: 1.01 K/UL — HIGH (ref 0–0.9)
MONOCYTES NFR BLD AUTO: 13 % — SIGNIFICANT CHANGE UP (ref 2–14)
NEUTROPHILS # BLD AUTO: 5.26 K/UL — SIGNIFICANT CHANGE UP (ref 1.8–7.4)
NEUTROPHILS NFR BLD AUTO: 67.6 % — SIGNIFICANT CHANGE UP (ref 43–77)
NITRITE UR-MCNC: NEGATIVE — SIGNIFICANT CHANGE UP
NRBC BLD AUTO-RTO: 0 /100 WBCS — SIGNIFICANT CHANGE UP (ref 0–0)
PH UR: 8.5 (ref 5–8)
PLATELET # BLD AUTO: 177 K/UL — SIGNIFICANT CHANGE UP (ref 150–400)
POTASSIUM SERPL-MCNC: 4.3 MMOL/L — SIGNIFICANT CHANGE UP (ref 3.5–5.3)
POTASSIUM SERPL-SCNC: 4.3 MMOL/L — SIGNIFICANT CHANGE UP (ref 3.5–5.3)
PROT SERPL-MCNC: 8.2 G/DL — SIGNIFICANT CHANGE UP (ref 6–8.3)
PROT UR-MCNC: NEGATIVE MG/DL — SIGNIFICANT CHANGE UP
PROTHROM AB SERPL-ACNC: 12.6 SEC — SIGNIFICANT CHANGE UP (ref 9.9–13.4)
RBC # BLD: 4.63 M/UL — SIGNIFICANT CHANGE UP (ref 4.2–5.8)
RBC # FLD: 14.2 % — SIGNIFICANT CHANGE UP (ref 10.3–14.5)
SODIUM SERPL-SCNC: 136 MMOL/L — SIGNIFICANT CHANGE UP (ref 135–145)
SP GR SPEC: 1.01 — SIGNIFICANT CHANGE UP (ref 1–1.03)
TROPONIN T, HIGH SENSITIVITY RESULT: 29 NG/L — SIGNIFICANT CHANGE UP (ref 0–51)
UROBILINOGEN FLD QL: 0.2 MG/DL — SIGNIFICANT CHANGE UP (ref 0.2–1)
WBC # BLD: 7.77 K/UL — SIGNIFICANT CHANGE UP (ref 3.8–10.5)
WBC # FLD AUTO: 7.77 K/UL — SIGNIFICANT CHANGE UP (ref 3.8–10.5)

## 2025-03-02 PROCEDURE — 70498 CT ANGIOGRAPHY NECK: CPT | Mod: 26

## 2025-03-02 PROCEDURE — 0042T: CPT

## 2025-03-02 PROCEDURE — 99285 EMERGENCY DEPT VISIT HI MDM: CPT

## 2025-03-02 PROCEDURE — 70450 CT HEAD/BRAIN W/O DYE: CPT | Mod: 26,XU

## 2025-03-02 PROCEDURE — 70496 CT ANGIOGRAPHY HEAD: CPT | Mod: 26

## 2025-03-02 RX ORDER — DEXTROSE 50 % IN WATER 50 %
12.5 SYRINGE (ML) INTRAVENOUS ONCE
Refills: 0 | Status: DISCONTINUED | OUTPATIENT
Start: 2025-03-02 | End: 2025-03-04

## 2025-03-02 RX ORDER — AMLODIPINE AND OLMESARTAN MEDOXOMIL 5; 40 MG/1; MG/1
1 TABLET ORAL
Refills: 0 | DISCHARGE

## 2025-03-02 RX ORDER — MECLIZINE HCL 12.5 MG
25 TABLET ORAL EVERY 8 HOURS
Refills: 0 | Status: DISCONTINUED | OUTPATIENT
Start: 2025-03-02 | End: 2025-03-04

## 2025-03-02 RX ORDER — ASPIRIN 325 MG
81 TABLET ORAL DAILY
Refills: 0 | Status: DISCONTINUED | OUTPATIENT
Start: 2025-03-02 | End: 2025-03-04

## 2025-03-02 RX ORDER — LOSARTAN POTASSIUM 100 MG/1
100 TABLET, FILM COATED ORAL DAILY
Refills: 0 | Status: DISCONTINUED | OUTPATIENT
Start: 2025-03-02 | End: 2025-03-02

## 2025-03-02 RX ORDER — ONDANSETRON HCL/PF 4 MG/2 ML
4 VIAL (ML) INJECTION ONCE
Refills: 0 | Status: COMPLETED | OUTPATIENT
Start: 2025-03-02 | End: 2025-03-02

## 2025-03-02 RX ORDER — ROSUVASTATIN CALCIUM 20 MG/1
20 TABLET, FILM COATED ORAL AT BEDTIME
Refills: 0 | Status: DISCONTINUED | OUTPATIENT
Start: 2025-03-02 | End: 2025-03-04

## 2025-03-02 RX ORDER — INSULIN LISPRO 100 U/ML
INJECTION, SOLUTION INTRAVENOUS; SUBCUTANEOUS AT BEDTIME
Refills: 0 | Status: DISCONTINUED | OUTPATIENT
Start: 2025-03-02 | End: 2025-03-04

## 2025-03-02 RX ORDER — INSULIN LISPRO 100 U/ML
INJECTION, SOLUTION INTRAVENOUS; SUBCUTANEOUS
Refills: 0 | Status: DISCONTINUED | OUTPATIENT
Start: 2025-03-02 | End: 2025-03-04

## 2025-03-02 RX ORDER — GLUCAGON 3 MG/1
1 POWDER NASAL ONCE
Refills: 0 | Status: DISCONTINUED | OUTPATIENT
Start: 2025-03-02 | End: 2025-03-04

## 2025-03-02 RX ORDER — ENOXAPARIN SODIUM 100 MG/ML
40 INJECTION SUBCUTANEOUS EVERY 24 HOURS
Refills: 0 | Status: DISCONTINUED | OUTPATIENT
Start: 2025-03-02 | End: 2025-03-04

## 2025-03-02 RX ORDER — MECLIZINE HCL 12.5 MG
25 TABLET ORAL ONCE
Refills: 0 | Status: COMPLETED | OUTPATIENT
Start: 2025-03-02 | End: 2025-03-02

## 2025-03-02 RX ORDER — HYDROCHLOROTHIAZIDE 50 MG/1
1 TABLET ORAL
Refills: 0 | DISCHARGE

## 2025-03-02 RX ORDER — SODIUM CHLORIDE 9 G/1000ML
1000 INJECTION, SOLUTION INTRAVENOUS
Refills: 0 | Status: DISCONTINUED | OUTPATIENT
Start: 2025-03-02 | End: 2025-03-04

## 2025-03-02 RX ORDER — SODIUM CHLORIDE 9 G/1000ML
1000 INJECTION, SOLUTION INTRAVENOUS ONCE
Refills: 0 | Status: COMPLETED | OUTPATIENT
Start: 2025-03-02 | End: 2025-03-02

## 2025-03-02 RX ORDER — DEXTROSE 50 % IN WATER 50 %
25 SYRINGE (ML) INTRAVENOUS ONCE
Refills: 0 | Status: DISCONTINUED | OUTPATIENT
Start: 2025-03-02 | End: 2025-03-04

## 2025-03-02 RX ORDER — DEXTROSE 50 % IN WATER 50 %
15 SYRINGE (ML) INTRAVENOUS ONCE
Refills: 0 | Status: DISCONTINUED | OUTPATIENT
Start: 2025-03-02 | End: 2025-03-04

## 2025-03-02 RX ORDER — METFORMIN HYDROCHLORIDE 850 MG/1
1 TABLET ORAL
Refills: 0 | DISCHARGE

## 2025-03-02 RX ORDER — AMLODIPINE BESYLATE 10 MG/1
10 TABLET ORAL ONCE
Refills: 0 | Status: COMPLETED | OUTPATIENT
Start: 2025-03-02 | End: 2025-03-02

## 2025-03-02 RX ORDER — VARDENAFIL HYDROCHLORIDE 5 MG/1
1 TABLET, FILM COATED ORAL
Refills: 0 | DISCHARGE

## 2025-03-02 RX ADMIN — AMLODIPINE BESYLATE 10 MILLIGRAM(S): 10 TABLET ORAL at 17:07

## 2025-03-02 RX ADMIN — Medication 4 MILLIGRAM(S): at 16:03

## 2025-03-02 RX ADMIN — Medication 25 MILLIGRAM(S): at 16:53

## 2025-03-02 RX ADMIN — Medication 50 MILLIGRAM(S): at 17:07

## 2025-03-02 RX ADMIN — ROSUVASTATIN CALCIUM 20 MILLIGRAM(S): 20 TABLET, FILM COATED ORAL at 21:19

## 2025-03-02 RX ADMIN — SODIUM CHLORIDE 1000 MILLILITER(S): 9 INJECTION, SOLUTION INTRAVENOUS at 16:57

## 2025-03-02 NOTE — H&P ADULT - ASSESSMENT
79 R-H M with h/o L carotid endarterectomy (~2018), R goiter, HTN, DM, HLD presented to the ED with room spinning dizziness. Today 3/2, patient was watching sports on his chair, when, after using a nasal wash for sinus blockage, developed room spinning dizziness that was worse when he was looking towards the right. He also was using Qtip to clean his ears in the AM as well. Decided to come to the ED. When EMS arrived, he also complained of a faint fluctuating numbness in LLE. CODE STROKE called at 1531. NIHSS 1. mRS 0. CT head demonstrated no acute infarct, hemorrhage, nor mass effect. CT angio redemonstrated a previous R vert occlusion with distal reconstitution and evidence of L carotid endarterectomy. Not candidate for tenecteplase because low NIHSS. Not candidate for thrombectomy because no LVO.       IMPRESSION   Overall stable neurologically. Room spinning dizziness and right beating nystagmus, positive head impulse test, and negative test of skew likely due to peripheral vertigo   LLE numbness, wide differential but considering peripheral neuropathy, less likely ischemic etiology     Plan  [] Admit to stroke floor under Dr. Elayne Ayala  [] MRI brain with IAC protocol  [] MR angio head without contrast  [] MR angio neck with contrast   [] A1C, Lipid Panel   [] BP measurements in both arms + orthostatic VS  [] consider IVFs  [] Continue home aspirin 81mg Qd  [] Continue home rosuvastatin 20mg Qd   [] Meclizine 25mg BID PRN   [] Refer for Vestibular Rehab on discharge  [] ENT f/u outpatient: has appointment with ENT on 3/4  [] Neurology f/u outpatient: has appointment with Michel Cade (Sara Juares) on 3/11    Discussed with David Juares, Vascular Neurology fellow under supervision of Patrick Mercado, Vascular Neurology attending   Patient to be seen by team and attending. Note finalized upon attending attestation.    79 R-H M with h/o L carotid endarterectomy (~2018), R goiter, HTN, DM, HLD presented to the ED with room spinning dizziness. Today 3/2, patient was watching sports on his chair, when, after using a nasal wash for sinus blockage, developed room spinning dizziness that was worse when he was looking towards the right. He also was using Qtip to clean his ears in the AM as well. Decided to come to the ED. When EMS arrived, he also complained of a faint fluctuating numbness in LLE. CODE STROKE called at 1531. NIHSS 1. mRS 0. CT head demonstrated no acute infarct, hemorrhage, nor mass effect. CT angio redemonstrated a previous R vert occlusion with distal reconstitution and evidence of L carotid endarterectomy. Not candidate for tenecteplase because low NIHSS. Not candidate for thrombectomy because no LVO.       IMPRESSION   Overall stable neurologically. Room spinning dizziness and right beating nystagmus, positive head impulse test, and negative test of skew likely due to peripheral vertigo   LLE numbness, wide differential but considering peripheral neuropathy, less likely ischemic etiology     Plan  [] Admit to stroke floor under Dr. Elayne Ayala  [] MRI brain with IAC protocol  [] MR angio head without contrast  [] MR angio neck with contrast   [] A1C, Lipid Panel   [] Permissive hypertension 220/120 for 48 hours followed by gradual normotension over 2-3 days  [] BP measurements in both arms + orthostatic VS  [] Neuro checks and vitals q4h  [] Continue home aspirin 81mg Qd  [] Continue home rosuvastatin 20mg Qd   [] Meclizine 25mg BID PRN   [] Refer for Vestibular Rehab on discharge  [] ENT f/u outpatient: (has appointment with ENT on 3/4)  [] GI ppx: Pantoprazole 40 mg  [] Diet: NPO pending dysphagia   [] DVT ppx: Lovenox + SCDs    Discussed with David Juares, Vascular Neurology fellow under supervision of Patrick Mercado, Vascular Neurology attending   Patient to be seen by team and attending. Note finalized upon attending attestation.

## 2025-03-02 NOTE — H&P ADULT - NSHPPHYSICALEXAM_GEN_ALL_CORE
Neurologic:  Mental status: Awake, alert and oriented x3.  Recent and remote memory intact.  Naming, repetition and comprehension intact. No dysarthria, no aphasia.  Follows all commands.    Cranial nerves: Pupils equally round and reactive to light, visual fields full, no nystagmus, extraocular muscles intact but noticeable right beating nystagmus on right gaze, V1 through V3 intact bilaterally and symmetric, face symmetric, hearing intact to finger rub, palate elevation symmetric, tongue was midline, shoulder shrug strength bilaterally 5/5.    Motor:  Normal bulk and tone, roughly 5/5 strength in bilateral upper and lower extremities.   strength 5/5.   Sensation: Intact to light touch  No neglect.   Coordination: No dysmetria on finger-to-nose and heel-to-shin.  No clumsiness.  Reflexes: 2+ in upper extremities 2+ in bilateral patellars 0+ in bilateral hell, bilateral toes down   Gait: unable to walk due to dizziness sensation    NIHSS: 1  mRS 0     RIght beating nystagmus   Positive head impulse test  Negative test of skew

## 2025-03-02 NOTE — ED PROVIDER NOTE - CLINICAL SUMMARY MEDICAL DECISION MAKING FREE TEXT BOX
Patient is a 79 year old male past medical history hypertension, prediabetes, vertigo presenting for dizziness. With initial vital signs significant for /80 otherwise within normal meds.  Code stroke called from triage with symptoms, with patient last known normal maybe within window, with acute onset room spinning dizziness similar to prior presentation to [facility]. Without focal deficits on exam, with limited gait due to symptoms, concern for CVA vs peripheral vertigo. Neuro at bedside, to appreciate recs, eval ct, labs, give meds, reassess. Patient is a 79 year old male past medical history hypertension, prediabetes, vertigo presenting for dizziness. With initial vital signs significant for /80 otherwise within normal meds.  Code stroke called from triage with symptoms, with patient last known normal maybe within window, with acute onset room spinning dizziness similar to prior presentation to [facility]. Without focal deficits on exam, with limited gait due to symptoms, concern for CVA vs peripheral vertigo. Neuro at bedside, to appreciate recs, eval ct, labs, give meds, reassess.    Dr. Moreno (Attending Physician)

## 2025-03-02 NOTE — ED ADULT NURSE NOTE - NSFALLRISKINTERV_ED_ALL_ED

## 2025-03-02 NOTE — ED ADULT NURSE NOTE - OBJECTIVE STATEMENT
79 year old male past medical history hypertension, prediabetes, vertigo presenting for dizziness. Patient was at [facility] 1 month ago with similar symptoms, had an MRA showing R vertebral artery and L carotid artery abnormalities.  Was discharged with neurovascular follow-up.  States that he was feeling well up until about noon today, at approximately 1230 had acute onset of spinning dizziness similar to original presentation to [facility].  Since then has had generalized weakness including his bilateral lower extremities, difficulty ambulating, nausea with vomiting nonbloody.  No associated abdominal pain, chest pain, vision change, headache.  Endorses left foot tingling as well.  No urine or stool changes.

## 2025-03-02 NOTE — ED PROVIDER NOTE - OBJECTIVE STATEMENT
Patient is a 79 year old male past medical history hypertension, prediabetes, vertigo presenting for dizziness. Patient was at [facility] 1 month ago with similar symptoms, had an MRA showing R vertebral artery and L carotid artery abnormalities.  Was discharged with neurovascular follow-up.  States that he was feeling well up until about noon today, at approximately 1230 had acute onset of spinning dizziness similar to original presentation to [facility].  Since then has had generalized weakness including his bilateral lower extremities, difficulty ambulating, nausea with vomiting nonbloody.  No associated abdominal pain, chest pain, vision change, headache.  Endorses left foot tingling as well.  No urine or stool changes.

## 2025-03-02 NOTE — CONSULT NOTE ADULT - SUBJECTIVE AND OBJECTIVE BOX
**STROKE CODE CONSULT NOTE**    Last known well time/Time of onset of symptoms: 1200 3/2/2025    HPI:    OF note, patient with known history of occluded R VA.  Status post L carotid endarterectomy with recent findings of mild narrowing of the CHRISTINE approximately 1cm from th e     PAST MEDICAL & SURGICAL HISTORY:  HTN (hypertension)      Thyroid goiter  right side; normal TFTs as per patient  followed by Dr. Carrera      Alcohol abuse  sober 22 years: was in inpatient rehab      HLD (hyperlipidemia)      Carotid artery stenosis      S/P hip replacement, right  2005, revision in 2018      S/P hip replacement, left  2014          FAMILY HISTORY:      SOCIAL HISTORY:  Smoking Cessation: Discussed    ROS:  Constitutional: No fever, weight loss or fatigue  Eyes: No eye pain, visual disturbances, or discharge  ENMT:  No difficulty hearing, tinnitus, vertigo; No sinus or throat pain  Neck: No pain or stiffness  Respiratory: No cough, wheezing, chills or hemoptysis  Cardiovascular: No chest pain, palpitations, shortness of breath, dizziness or leg swelling  Gastrointestinal: No abdominal pain. No nausea, vomiting or hematemesis; No diarrhea or constipation. Nohematochezia.  Genitourinary: No dysuria, frequency, hematuria or incontinence  Neurological: As per HPI  Skin: No itching, burning, rashes or lesions   Endocrine: No heat or cold intolerance; No hair loss  Musculoskeletal: No joint pain or swelling; No muscle, back or extremity pain  Psychiatric: No depression, anxiety, mood swings or difficulty sleeping  Heme/Lymph: No easy bruising or bleeding gums    MEDICATIONS  (STANDING):    MEDICATIONS  (PRN):      Allergies    No Known Allergies    Intolerances        Vital Signs Last 24 Hrs  T(C): 36.8 (02 Mar 2025 15:25), Max: 36.8 (02 Mar 2025 15:25)  T(F): 98.2 (02 Mar 2025 15:25), Max: 98.2 (02 Mar 2025 15:25)  HR: 65 (02 Mar 2025 15:25) (65 - 65)  BP: 170/80 (02 Mar 2025 15:25) (170/80 - 170/80)  BP(mean): --  RR: 20 (02 Mar 2025 15:25) (20 - 20)  SpO2: 98% (02 Mar 2025 15:25) (98% - 98%)    Parameters below as of 02 Mar 2025 15:25  Patient On (Oxygen Delivery Method): room air        PHYSICAL EXAM:  Constitutional: WDWN; NAD  Cardiovascular: RRR, no appreciable murmurs; no carotid bruits  Neurologic:  Mental status: Awake, alert and oriented x3.  Recent and remote memory intact.  Naming, repetition and comprehension intact.  Attention/concentration intact.  No dysarthria, no aphasia.  Fund of knowledge appropriate.    Cranial nerves: Fundoscopic exam demonstrated no abnormalities, pupils equally round and reactive to light, visual fields full, no nystagmus, extraocular muscles intact, V1 through V3 intact bilaterally and symmetric, face symmetric, hearing intact to finger rub, palate elevation symmetric, tongue was midline, sternocleidomastoid/shoulder shrug strength bilaterally 5/5.    Motor:  Normal bulk and tone, strength 5/5 in bilateral upper and lower extremities.   strength 5/5.  Rapid alternating movements intact and symmetric.   Sensation: Intact to light touch, proprioception, and pinprick.  No neglect.   Coordination: No dysmetria on finger-to-nose and heel-to-shin.  No clumsiness.  Reflexes: 2+ in upper and lower extremities, downgoing toes bilaterally  Gait: Narrow and steady. No ataxia.  Romberg negative    NIHSS:    Fingerstick Blood Glucose: CAPILLARY BLOOD GLUCOSE      POCT Blood Glucose.: 204 mg/dL (02 Mar 2025 15:30)       LABS:                        13.0   7.77  )-----------( 177      ( 02 Mar 2025 15:37 )             40.4                     RADIOLOGY & ADDITIONAL STUDIES:    IV-tenecteplase(Y/N):             N                      Bolus time:  Reason IV-tenecteplase not given: Low NIHSS    **STROKE CODE CONSULT NOTE**    Last known well time/Time of onset of symptoms: 1200 3/2/2025    HPI: 79 R-H M with h/o L carotid endarterectomy (~2018), R goiter, HTN, DM, HLD presented to the ED with room spinning dizziness. Today 3/2, patient was watching sports on his chair, when, after using a nasal wash for sinus blockage, developed room spinning dizziness that was worse when he was looking towards the right. He also was using Qtip to clean his ears in the AM as well. Decided to come to the ED. When EMS arrived, he also complained of a faint fluctuating numbness in LLE. CODE STROKE called at 1531. NIHSS 1. mRS 0. CT head demonstrated no acute infarct, hemorrhage, nor mass effect. CT angio redemonstrated a previous R vert occlusion with distal reconstitution and evidence of L carotid endarterectomy. Not candidate for tenecteplase because low NIHSS. Not candidate for thrombectomy because no LVO.     Not a smoker and does not use drugs. 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   Of note, patient 1/29 was driving when he had to pull over due to acute onset dizziness. He called his daughter and told the latter that he wanted to go to the ED. Went to [facility]. He was given IV hydration with mild improvement and discharged. Did not recall acquiring any imaging. Was recommended for outpatient followup with [facility]. Per notes, was evaluated at [facility] for TIA/BPPV. Recommended for MR imaging (results below):   Given results, was recommended to see ENT on 3/4 and has an appointment with Sara Juares at [address] [place] [address] Neurovascular outpatient on 3/11. Seen at bedside with daughter and wife.     OF note, patient with known history of occluded R VA.  Status post L carotid endarterectomy with recent findings of mild narrowing of the CHRISTINE approximately 1cm from th e     PAST MEDICAL & SURGICAL HISTORY:  HTN (hypertension)      Thyroid goiter  right side; normal TFTs as per patient  followed by Dr. Carrera      Alcohol abuse  sober 22 years: was in inpatient rehab      HLD (hyperlipidemia)      Carotid artery stenosis      S/P hip replacement, right  2005, revision in 2018      S/P hip replacement, left  2014          FAMILY HISTORY:      SOCIAL HISTORY:  Smoking Cessation: Discussed    ROS:  Neurological: As per HPI    MEDICATIONS  (STANDING):    MEDICATIONS  (PRN):      Allergies    No Known Allergies    Intolerances        Vital Signs Last 24 Hrs  T(C): 36.8 (02 Mar 2025 15:25), Max: 36.8 (02 Mar 2025 15:25)  T(F): 98.2 (02 Mar 2025 15:25), Max: 98.2 (02 Mar 2025 15:25)  HR: 65 (02 Mar 2025 15:25) (65 - 65)  BP: 170/80 (02 Mar 2025 15:25) (170/80 - 170/80)  BP(mean): --  RR: 20 (02 Mar 2025 15:25) (20 - 20)  SpO2: 98% (02 Mar 2025 15:25) (98% - 98%)    Parameters below as of 02 Mar 2025 15:25  Patient On (Oxygen Delivery Method): room air        PHYSICAL EXAM:  Constitutional: WDWN; NAD  Cardiovascular: RRR, no appreciable murmurs; no carotid bruits  Neurologic:  Mental status: Awake, alert and oriented x3.  Recent and remote memory intact.  Naming, repetition and comprehension intact.  Attention/concentration intact.  No dysarthria, no aphasia.  Fund of knowledge appropriate.    Cranial nerves: Fundoscopic exam demonstrated no abnormalities, pupils equally round and reactive to light, visual fields full, no nystagmus, extraocular muscles intact, V1 through V3 intact bilaterally and symmetric, face symmetric, hearing intact to finger rub, palate elevation symmetric, tongue was midline, sternocleidomastoid/shoulder shrug strength bilaterally 5/5.    Motor:  Normal bulk and tone, strength 5/5 in bilateral upper and lower extremities.   strength 5/5.  Rapid alternating movements intact and symmetric.   Sensation: Intact to light touch, proprioception, and pinprick.  No neglect.   Coordination: No dysmetria on finger-to-nose and heel-to-shin.  No clumsiness.  Reflexes: 2+ in upper and lower extremities, downgoing toes bilaterally  Gait: Narrow and steady. No ataxia.  Romberg negative    NIHSS:    Fingerstick Blood Glucose: CAPILLARY BLOOD GLUCOSE      POCT Blood Glucose.: 204 mg/dL (02 Mar 2025 15:30)       LABS:                        13.0   7.77  )-----------( 177      ( 02 Mar 2025 15:37 )             40.4                     RADIOLOGY & ADDITIONAL STUDIES:  [facility] Outpatient MRI 2/12   MR brain 2/12 was done given history of TIA. It showed moderate microvascular disease. R cerebellar lacunae. No restricted diffusion to suggest acute infarct.   MR neck angio wo IV contrast showed Mild narrowing of the CHRISTINE approximately 1 cm from the origin. Status post interval L carotid endarterectomy. No evidence of significant stenosis of the LICA. No flow related enhancement within the Right vertebral artery. Markedly enlarged right lobe of the thyroid.   MR head angio wo IV contrast: No evidence of aneurysm, vessel stenosis, or vascular malformation. No detectable flow seen within the distal Right vertebral artery.     [facility] CT imaging   1.  Occluded right vertebral artery, without distal emboli.  2.  Senescent cerebral changes without acute intracranial hemorrhage.  3.  Global Tmax abnormalities without concordant clot.  4.  MRI would be more sensitive for acute ischemia.  5.  Incidental right PCOM infundibulum, cannot categorically exclude an   aneurysm.  6.  Large right thyroid mass, recommend ultrasound.      IV-tenecteplase(Y/N):             N                      Bolus time:  Reason IV-tenecteplase not given: Low NIHSS    **STROKE CODE CONSULT NOTE**    Last known well time/Time of onset of symptoms: 1200 3/2/2025    HPI: 79 R-H M with h/o L carotid endarterectomy (~2018), R goiter, HTN, DM, HLD presented to the ED with room spinning dizziness. Today 3/2, patient was watching sports on his chair, when, after using a nasal wash for sinus blockage, developed room spinning dizziness that was worse when he was looking towards the right. He also was using Qtip to clean his ears in the AM as well. Decided to come to the ED. When EMS arrived, he also complained of a faint fluctuating numbness in LLE. CODE STROKE called at 1531. NIHSS 1. mRS 0. CT head demonstrated no acute infarct, hemorrhage, nor mass effect. CT angio redemonstrated a previous R vert occlusion with distal reconstitution and evidence of L carotid endarterectomy. Not candidate for tenecteplase because low NIHSS. Not candidate for thrombectomy because no LVO.     Not a smoker and does not use drugs. 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   Of note, patient 1/29 was driving when he had to pull over due to acute onset dizziness. He called his daughter and told the latter that he wanted to go to the ED. Went to [facility]. He was given IV hydration with mild improvement and discharged. Did not recall acquiring any imaging. Was recommended for outpatient followup with [facility]. Per notes, was evaluated at [facility] for TIA/BPPV. Recommended for MR imaging (results below):   Given results, was recommended to see ENT on 3/4 and has an appointment with Sara Juares at [address] [place] [address] Neurovascular outpatient on 3/11. Seen at bedside with daughter and wife.     OF note, patient with known history of occluded R VA.  Status post L carotid endarterectomy with recent findings of mild narrowing of the CHRISTINE approximately 1cm from th e     PAST MEDICAL & SURGICAL HISTORY:  HTN (hypertension)      Thyroid goiter  right side; normal TFTs as per patient  followed by Dr. Carrera      Alcohol abuse  sober 22 years: was in inpatient rehab      HLD (hyperlipidemia)      Carotid artery stenosis      S/P hip replacement, right  2005, revision in 2018      S/P hip replacement, left  2014          FAMILY HISTORY:      SOCIAL HISTORY:  Smoking Cessation: Discussed    ROS:  Neurological: As per HPI    MEDICATIONS  (STANDING):    MEDICATIONS  (PRN):      Allergies    No Known Allergies    Intolerances        Vital Signs Last 24 Hrs  T(C): 36.8 (02 Mar 2025 15:25), Max: 36.8 (02 Mar 2025 15:25)  T(F): 98.2 (02 Mar 2025 15:25), Max: 98.2 (02 Mar 2025 15:25)  HR: 65 (02 Mar 2025 15:25) (65 - 65)  BP: 170/80 (02 Mar 2025 15:25) (170/80 - 170/80)  BP(mean): --  RR: 20 (02 Mar 2025 15:25) (20 - 20)  SpO2: 98% (02 Mar 2025 15:25) (98% - 98%)    Parameters below as of 02 Mar 2025 15:25  Patient On (Oxygen Delivery Method): room air    Neurologic:  Mental status: Awake, alert and oriented x3.  Recent and remote memory intact.  Naming, repetition and comprehension intact. No dysarthria, no aphasia.  Follows all commands.    Cranial nerves: Pupils equally round and reactive to light, visual fields full, no nystagmus, extraocular muscles intact but noticeable right beating nystagmus on right gaze, V1 through V3 intact bilaterally and symmetric, face symmetric, hearing intact to finger rub, palate elevation symmetric, tongue was midline, shoulder shrug strength bilaterally 5/5.    Motor:  Normal bulk and tone, roughly 5/5 strength in bilateral upper and lower extremities.   strength 5/5.   Sensation: Intact to light touch  No neglect.   Coordination: No dysmetria on finger-to-nose and heel-to-shin.  No clumsiness.  Reflexes: 2+ in upper extremities 2+ in bilateral patellars 0+ in bilateral hell, bilateral toes down   Gait: unable to walk due to dizziness sensation    NIHSS: 1  mRS 0     RIght beating nystagmus   Positive head impulse test  Negative test of skew     Fingerstick Blood Glucose: CAPILLARY BLOOD GLUCOSE      POCT Blood Glucose.: 204 mg/dL (02 Mar 2025 15:30)       LABS:                        13.0   7.77  )-----------( 177      ( 02 Mar 2025 15:37 )             40.4                     RADIOLOGY & ADDITIONAL STUDIES:  [facility] Outpatient MRI 2/12   MR brain 2/12 was done given history of TIA. It showed moderate microvascular disease. R cerebellar lacunae. No restricted diffusion to suggest acute infarct.   MR neck angio wo IV contrast showed Mild narrowing of the CHRISTINE approximately 1 cm from the origin. Status post interval L carotid endarterectomy. No evidence of significant stenosis of the LICA. No flow related enhancement within the Right vertebral artery. Markedly enlarged right lobe of the thyroid.   MR head angio wo IV contrast: No evidence of aneurysm, vessel stenosis, or vascular malformation. No detectable flow seen within the distal Right vertebral artery.     [facility] CT imaging   1.  Occluded right vertebral artery, without distal emboli.  2.  Senescent cerebral changes without acute intracranial hemorrhage.  3.  Global Tmax abnormalities without concordant clot.  4.  MRI would be more sensitive for acute ischemia.  5.  Incidental right PCOM infundibulum, cannot categorically exclude an   aneurysm.  6.  Large right thyroid mass, recommend ultrasound.      IV-tenecteplase(Y/N):             N                      Bolus time:  Reason IV-tenecteplase not given: Low NIHSS

## 2025-03-02 NOTE — CONSULT NOTE ADULT - ASSESSMENT
ASSESSMENT       IMPRESSION   Overall stable neurologically. Generalized weakness and room spinning dizziness     RECOMMENDATION         Patient to be seen by team and attending. Note finalized upon attending attestation.  ASSESSMENT   79 R-H M with h/o L carotid endarterectomy (~2018), R goiter, HTN, DM, HLD presented to the ED with room spinning dizziness. Today 3/2, patient was watching sports on his chair, when, after using a nasal wash for sinus blockage, developed room spinning dizziness that was worse when he was looking towards the right. He also was using Qtip to clean his ears in the AM as well. Decided to come to the ED. When EMS arrived, he also complained of a faint fluctuating numbness in LLE. CODE STROKE called at 1531. NIHSS 1. mRS 0. CT head demonstrated no acute infarct, hemorrhage, nor mass effect. CT angio redemonstrated a previous R vert occlusion with distal reconstitution and evidence of L carotid endarterectomy. Not candidate for tenecteplase because low NIHSS. Not candidate for thrombectomy because no LVO.       IMPRESSION   Overall stable neurologically. Room spinning dizziness and right beating nystagmus, positive head impulse test, and negative test of skew likely due to peripheral vertigo   LLE numbness, wide differential but considering peripheral neuropathy, less likely ischemic etiology     RECOMMENDATION   [] CDU  [] MRI brain with IAC protocol  [] MR angio head without contrast  [] MR angio neck with contrast   [] BP measurements in both arms + orthostatic VS  [] consider IVFs  [] Continue home aspirin 81mg Qd  [] Continue home rosuvastatin 20mg Qd   [] Meclizine 25mg BID PRN   [] Refer for Vestibular Rehab on discharge  [] ENT f/u outpatient: has appointment with ENT on 3/4  [] Neurology f/u outpatient: has appointment with Rajani Cade (Sara Juares) on 3/11    Discussed with David Juares, Vascular Neurology fellow under supervision of Patrick Mercado, Vascular Neurology attending   Patient to be seen by team and attending. Note finalized upon attending attestation.  ASSESSMENT   79 R-H M with h/o L carotid endarterectomy (~2018), R goiter, HTN, DM, HLD presented to the ED with room spinning dizziness. Today 3/2, patient was watching sports on his chair, when, after using a nasal wash for sinus blockage, developed room spinning dizziness that was worse when he was looking towards the right. He also was using Qtip to clean his ears in the AM as well. Decided to come to the ED. When EMS arrived, he also complained of a faint fluctuating numbness in LLE. CODE STROKE called at 1531. NIHSS 1. mRS 0. CT head demonstrated no acute infarct, hemorrhage, nor mass effect. CT angio redemonstrated a previous R vert occlusion with distal reconstitution and evidence of L carotid endarterectomy. Not candidate for tenecteplase because low NIHSS. Not candidate for thrombectomy because no LVO.       IMPRESSION   Overall stable neurologically. Room spinning dizziness and right beating nystagmus, positive head impulse test, and negative test of skew likely due to peripheral vertigo   LLE numbness, wide differential but considering peripheral neuropathy, less likely ischemic etiology     RECOMMENDATION   [] CDU  [] MRI brain with IAC protocol  [] MR angio head without contrast  [] MR angio neck with contrast   [] Please draw A1C, Lipid Panel   [] BP measurements in both arms + orthostatic VS  [] consider IVFs  [] Continue home aspirin 81mg Qd  [] Continue home rosuvastatin 20mg Qd   [] Meclizine 25mg BID PRN   [] Refer for Vestibular Rehab on discharge  [] ENT f/u outpatient: has appointment with ENT on 3/4  [] Neurology f/u outpatient: has appointment with Michel Juares) on 3/11    Discussed with David Juares, Vascular Neurology fellow under supervision of Patrick Mercado, Vascular Neurology attending   Patient to be seen by team and attending. Note finalized upon attending attestation.

## 2025-03-02 NOTE — ED PROVIDER NOTE - PHYSICAL EXAMINATION
CONSTITUTIONAL: awake, appears uncomfortable.   HEAD: Normocephalic; atraumatic.  EYES: no conjunctival injection. PERRL. +R side nystagmus horizontal. No vertical nystagmus  ENT: No nasal discharge; airway clear.  CARD: S1, S2 normal; regular rate  RESP: No wheezes, rales or rhonchi. Good air movement bilaterally.   NEURO: alert, oriented to ppte, cn 2-12 intact, motor strength 5/5 throughout, sensation intact throughout. F2N intact. Eyes as above. nystagmus with head impulse. No skew deviation.  PSYCH: Cooperative, appropriate.

## 2025-03-02 NOTE — STROKE CODE NOTE - NIH STROKE SCALE: 9. BEST LANGUAGE, QM
Additional History: Pt states she is here today for advice on her neck. She said it is wrinkly and old.
(0) No aphasia; normal

## 2025-03-02 NOTE — H&P ADULT - NSHPLABSRESULTS_GEN_ALL_CORE
Fingerstick Blood Glucose: CAPILLARY BLOOD GLUCOSE      POCT Blood Glucose.: 204 mg/dL (02 Mar 2025 15:30)       LABS:                        13.0   7.77  )-----------( 177      ( 02 Mar 2025 15:37 )             40.4                     RADIOLOGY & ADDITIONAL STUDIES:  [facility] Outpatient MRI 2/12   MR brain 2/12 was done given history of TIA. It showed moderate microvascular disease. R cerebellar lacunae. No restricted diffusion to suggest acute infarct.   MR neck angio wo IV contrast showed Mild narrowing of the CHRISTINE approximately 1 cm from the origin. Status post interval L carotid endarterectomy. No evidence of significant stenosis of the LICA. No flow related enhancement within the Right vertebral artery. Markedly enlarged right lobe of the thyroid.   MR head angio wo IV contrast: No evidence of aneurysm, vessel stenosis, or vascular malformation. No detectable flow seen within the distal Right vertebral artery.     [facility] CT imaging   1.  Occluded right vertebral artery, without distal emboli.  2.  Senescent cerebral changes without acute intracranial hemorrhage.  3.  Global Tmax abnormalities without concordant clot.  4.  MRI would be more sensitive for acute ischemia.  5.  Incidental right PCOM infundibulum, cannot categorically exclude an   aneurysm.  6.  Large right thyroid mass, recommend ultrasound.      IV-tenecteplase(Y/N):             N                      Bolus time:  Reason IV-tenecteplase not given: Low NIHSS

## 2025-03-02 NOTE — H&P ADULT - ATTENDING COMMENTS
I reviewed available diagnostic studies, and reviewed images personally. I agree with resident's history, exam, orders placed, and plan of care. Total care time spent, 75 min. This excludes any time spent on separate procedures or teaching. Medical issues needing to be addressed include: evaulation for cerebral ischemia, hx of L carotid endarterectomy in 2018, R goiter/mass, HTN, DM, HLD, dizziness/vertigo, and mild LLE numbness that seems to come and go. Pt has findings on exam that seem consistent with peripheral vertigo (non-direction changing right beating nystagmus, positive head impulse test, and negative test of skew). However MRI reveals punctate diffusion restriction in L posterior periventricular white matter w/ ADC correlate (acute/early subacute). Most likely asymptomatic, and does not explain LLE numbness. 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 DAPT for 21days then monotherapy, f up risk factor labs, and f up ECHO. Pt has a cardiologist, loop w/ own cardiologist discussed. Thyroid u/s completed, fine needle biopsy recommended. Pt reports he is aware of this and this was going to be worked up outpatient, will cont with his outpt plan. Check orthostatics - labile BP, pt off bp meds, bradycardic in 50s  and bp 120-160. With activity today up to 190 briefly- resume amlodipine and 12.5 HTCZ, hold off labetalol. SBP goal <180. Of note pt also reports he's not always compliant with his meds, though he now understands and states will be compliant with meds. Service provided on 3/3/2025.

## 2025-03-02 NOTE — ED PROVIDER NOTE - PROGRESS NOTE DETAILS
Ras Winter MD PGY3: redemonstration of vertebral occlusion. neuro recommends cdu for mri. Dr. Moreno (Attending Physician)  Unilateral R nystagmus when looking right, and diplopia when looking up. My head impulse test was normal concerning for stroke. Unable to ambulate.  Will admit to stroke floor.

## 2025-03-02 NOTE — H&P ADULT - HISTORY OF PRESENT ILLNESS
79 R-H M with h/o L carotid endarterectomy (~2018), R goiter, HTN, DM, HLD presented to the ED with room spinning dizziness. Today 3/2, patient was watching sports on his chair, when, after using a nasal wash for sinus blockage, developed room spinning dizziness that was worse when he was looking towards the right. He also was using Qtip to clean his ears in the AM as well. Decided to come to the ED. When EMS arrived, he also complained of a faint fluctuating numbness in LLE. CODE STROKE called at 1531. NIHSS 1. mRS 0. CT head demonstrated no acute infarct, hemorrhage, nor mass effect. CT angio redemonstrated a previous R vert occlusion with distal reconstitution and evidence of L carotid endarterectomy. Not candidate for tenecteplase because low NIHSS. Not candidate for thrombectomy because no LVO.       Not a smoker and does not use drugs. 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     Of note, patient 1/29 was driving when he had to pull over due to acute onset dizziness. He called his daughter and told the latter that he wanted to go to the ED. Went to [facility]. He was given IV hydration with mild improvement and discharged. Did not recall acquiring any imaging. Was recommended for outpatient followup with [facility]. Per notes, was evaluated at [facility] for TIA/BPPV. Recommended for MR imaging (results below):     MR brain 2/12 was done given history of TIA. It showed moderate microvascular disease. R cerebellar lacunae. No restricted diffusion to suggest acute infarct.   MR neck angio wo IV contrast showed Mild narrowing of the CHRISTINE approximately 1 cm from the origin. Status post interval L carotid endarterectomy. No evidence of significant stenosis of the LICA. No flow related enhancement within the Right vertebral artery. Markedly enlarged right lobe of the thyroid.   MR head angio wo IV contrast: No evidence of aneurysm, vessel stenosis, or vascular malformation. No detectable flow seen within the distal Right vertebral artery.     Given results, was recommended to see ENT on 3/4 and has an appointment with Sara Juares at [address] [place] [address] Neurovascular outpatient on 3/11. Seen at bedside with daughter and wife.     OF note, patient with known history of occluded R VA.  Status post L carotid endarterectomy with recent findings of mild narrowing of the CHRISTINE approximately 1cm from the

## 2025-03-03 ENCOUNTER — RESULT REVIEW (OUTPATIENT)
Age: 80
End: 2025-03-03

## 2025-03-03 LAB
A1C WITH ESTIMATED AVERAGE GLUCOSE RESULT: 7.1 % — HIGH (ref 4–5.6)
ALBUMIN SERPL ELPH-MCNC: 3.5 G/DL — SIGNIFICANT CHANGE UP (ref 3.3–5)
ALP SERPL-CCNC: 123 U/L — HIGH (ref 40–120)
ALT FLD-CCNC: 17 U/L — SIGNIFICANT CHANGE UP (ref 10–45)
ANION GAP SERPL CALC-SCNC: 11 MMOL/L — SIGNIFICANT CHANGE UP (ref 5–17)
AST SERPL-CCNC: 15 U/L — SIGNIFICANT CHANGE UP (ref 10–40)
BILIRUB SERPL-MCNC: 0.2 MG/DL — SIGNIFICANT CHANGE UP (ref 0.2–1.2)
BUN SERPL-MCNC: 17 MG/DL — SIGNIFICANT CHANGE UP (ref 7–23)
CALCIUM SERPL-MCNC: 9.3 MG/DL — SIGNIFICANT CHANGE UP (ref 8.4–10.5)
CHLORIDE SERPL-SCNC: 105 MMOL/L — SIGNIFICANT CHANGE UP (ref 96–108)
CHOLEST SERPL-MCNC: 133 MG/DL — SIGNIFICANT CHANGE UP
CO2 SERPL-SCNC: 27 MMOL/L — SIGNIFICANT CHANGE UP (ref 22–31)
CREAT SERPL-MCNC: 1.53 MG/DL — HIGH (ref 0.5–1.3)
EGFR: 47 ML/MIN/1.73M2 — LOW
EGFR: 47 ML/MIN/1.73M2 — LOW
ESTIMATED AVERAGE GLUCOSE: 157 MG/DL — HIGH (ref 68–114)
GAS PNL BLDV: SIGNIFICANT CHANGE UP
GLUCOSE BLDC GLUCOMTR-MCNC: 123 MG/DL — HIGH (ref 70–99)
GLUCOSE BLDC GLUCOMTR-MCNC: 129 MG/DL — HIGH (ref 70–99)
GLUCOSE BLDC GLUCOMTR-MCNC: 151 MG/DL — HIGH (ref 70–99)
GLUCOSE SERPL-MCNC: 118 MG/DL — HIGH (ref 70–99)
HDLC SERPL-MCNC: 42 MG/DL — SIGNIFICANT CHANGE UP
LIPID PNL WITH DIRECT LDL SERPL: 74 MG/DL — SIGNIFICANT CHANGE UP
MAGNESIUM SERPL-MCNC: 2 MG/DL — SIGNIFICANT CHANGE UP (ref 1.6–2.6)
NON HDL CHOLESTEROL: 91 MG/DL — SIGNIFICANT CHANGE UP
PHOSPHATE SERPL-MCNC: 4.7 MG/DL — HIGH (ref 2.5–4.5)
POTASSIUM SERPL-MCNC: 4.1 MMOL/L — SIGNIFICANT CHANGE UP (ref 3.5–5.3)
POTASSIUM SERPL-SCNC: 4.1 MMOL/L — SIGNIFICANT CHANGE UP (ref 3.5–5.3)
PROT SERPL-MCNC: 6.8 G/DL — SIGNIFICANT CHANGE UP (ref 6–8.3)
SODIUM SERPL-SCNC: 143 MMOL/L — SIGNIFICANT CHANGE UP (ref 135–145)
TRIGL SERPL-MCNC: 90 MG/DL — SIGNIFICANT CHANGE UP

## 2025-03-03 PROCEDURE — 93306 TTE W/DOPPLER COMPLETE: CPT | Mod: 26

## 2025-03-03 PROCEDURE — 76536 US EXAM OF HEAD AND NECK: CPT | Mod: 26

## 2025-03-03 PROCEDURE — 70549 MR ANGIOGRAPH NECK W/O&W/DYE: CPT | Mod: 26

## 2025-03-03 PROCEDURE — 70553 MRI BRAIN STEM W/O & W/DYE: CPT | Mod: 26

## 2025-03-03 PROCEDURE — 70544 MR ANGIOGRAPHY HEAD W/O DYE: CPT | Mod: 26,XU

## 2025-03-03 PROCEDURE — 99223 1ST HOSP IP/OBS HIGH 75: CPT

## 2025-03-03 RX ORDER — AMLODIPINE BESYLATE 10 MG/1
5 TABLET ORAL DAILY
Refills: 0 | Status: DISCONTINUED | OUTPATIENT
Start: 2025-03-03 | End: 2025-03-04

## 2025-03-03 RX ORDER — ACETAMINOPHEN 500 MG/5ML
650 LIQUID (ML) ORAL EVERY 6 HOURS
Refills: 0 | Status: DISCONTINUED | OUTPATIENT
Start: 2025-03-03 | End: 2025-03-04

## 2025-03-03 RX ORDER — CLOPIDOGREL BISULFATE 75 MG/1
75 TABLET, FILM COATED ORAL DAILY
Refills: 0 | Status: DISCONTINUED | OUTPATIENT
Start: 2025-03-03 | End: 2025-03-04

## 2025-03-03 RX ORDER — FLUTICASONE PROPIONATE 50 UG/1
1 SPRAY, METERED NASAL
Refills: 0 | Status: DISCONTINUED | OUTPATIENT
Start: 2025-03-03 | End: 2025-03-04

## 2025-03-03 RX ADMIN — CLOPIDOGREL BISULFATE 75 MILLIGRAM(S): 75 TABLET, FILM COATED ORAL at 14:03

## 2025-03-03 RX ADMIN — Medication 650 MILLIGRAM(S): at 01:33

## 2025-03-03 RX ADMIN — Medication 650 MILLIGRAM(S): at 07:25

## 2025-03-03 RX ADMIN — Medication 650 MILLIGRAM(S): at 06:54

## 2025-03-03 RX ADMIN — ENOXAPARIN SODIUM 40 MILLIGRAM(S): 100 INJECTION SUBCUTANEOUS at 05:19

## 2025-03-03 RX ADMIN — AMLODIPINE BESYLATE 5 MILLIGRAM(S): 10 TABLET ORAL at 14:03

## 2025-03-03 RX ADMIN — Medication 40 MILLIGRAM(S): at 05:19

## 2025-03-03 RX ADMIN — Medication 81 MILLIGRAM(S): at 14:02

## 2025-03-03 RX ADMIN — Medication 650 MILLIGRAM(S): at 00:59

## 2025-03-03 RX ADMIN — ROSUVASTATIN CALCIUM 20 MILLIGRAM(S): 20 TABLET, FILM COATED ORAL at 21:36

## 2025-03-03 NOTE — OCCUPATIONAL THERAPY INITIAL EVALUATION ADULT - LIVES WITH, PROFILE
Pt lives with wife in private home, a few steps to enter, flight to bedroom, tub in bathroom. Pt I in ADLs and ambulation prior to admission

## 2025-03-03 NOTE — SPEECH LANGUAGE PATHOLOGY EVALUATION - SLP DIAGNOSIS
Chart reviewed, attempted to see pt for bedside swallow evaluation however pt currently working with PT/OT. Service will f/u as appropriate.

## 2025-03-03 NOTE — SPEECH LANGUAGE PATHOLOGY EVALUATION - COMMENTS
MR head angio wo IV contrast: No evidence of aneurysm, vessel stenosis, or vascular malformation. No detectable flow seen within the distal Right vertebral artery. Given results, was recommended to see ENT on 3/4 and has an appointment with Sara Juares at [address] [place] [address] Neurovascular outpatient on 3/11. Seen at bedside with daughter and wife. OF note, patient with known history of occluded R VA. Status post L carotid endarterectomy with recent findings of mild narrowing of the CHRISTINE approximately 1cm from the     Pt admitted for Vertigo, r/o acute stroke    Pt unknown to this service

## 2025-03-03 NOTE — PHYSICAL THERAPY INITIAL EVALUATION ADULT - PERTINENT HX OF CURRENT PROBLEM, REHAB EVAL
78yo male PMH hypertension, prediabetes, vertigo presenting for dizziness. Patient was at [facility] 1 month ago with similar symptoms, had an MRA showing R vertebral artery and L carotid artery abnormalities.  Was discharged with neurovascular follow-up.  States that he was feeling well up until about noon today, at approximately 1230 had acute onset of spinning dizziness similar to original presentation to [facility].  Since then has had generalized weakness including his bilateral lower extremities, difficulty ambulating, nausea with vomiting nonbloody.  No associated abdominal pain, chest pain, vision change, headache.  Endorses left foot tingling as well.  No urine or stool changes.  3/2/25: CT brain: Occluded right vertebral artery, without distal emboli. Senescent cerebral changes without acute intracranial hemorrhage. Global Tmax abnormalities without concordant clot.  MRI would be more sensitive for acute ischemia. Incidental right PCOM infundibulum, cannot categorically exclude an aneurysm. Large right thyroid mass, recommend ultrasound. 80yo male PMH hypertension, prediabetes, vertigo presenting for dizziness. Patient was at [facility] 1 month ago with similar symptoms, had an MRA showing R vertebral artery and L carotid artery abnormalities.  Was discharged with neurovascular follow-up.  States that he was feeling well up until about noon today, at approximately 1230 had acute onset of spinning dizziness similar to original presentation to [facility].  Since then has had generalized weakness including his bilateral lower extremities, difficulty ambulating, nausea with vomiting nonbloody.  3/2/25: CT brain: Occluded right vertebral artery, without distal emboli. Senescent cerebral changes without acute intracranial hemorrhage. Global Tmax abnormalities without concordant clot.  MRI would be more sensitive for acute ischemia. Incidental right PCOM infundibulum, cannot categorically exclude an aneurysm. Large right thyroid mass, recommend ultrasound.  MRI head:  Small acute/subacute infarct within the left posterior parietal periventricular white matter with associated cytotoxic edema.  MRA head/neck: Occluded right proximal vertebral artery which appears unchanged. Otherwise, no large vessel occlusion or major stenosis. Occluded right-sided V4 segment which appears unchanged. Otherwise, no large vessel occlusion or major stenosis

## 2025-03-03 NOTE — PHYSICAL THERAPY INITIAL EVALUATION ADULT - PLANNED THERAPY INTERVENTIONS, PT EVAL
stair negotiation GOAL: Patient will negotiate up / down 10 steps independently in  2 weeks/balance training/bed mobility training/gait training/transfer training

## 2025-03-03 NOTE — PHYSICAL THERAPY INITIAL EVALUATION ADULT - ADDITIONAL COMMENTS
Patient lives in private home with spouse, 2 steps to enter, 1 flight inside. Patient independent with ADLs/IADLs, owns a cane and rolling walker but does not use. Goes to the gym 4x/week.

## 2025-03-03 NOTE — SPEECH LANGUAGE PATHOLOGY EVALUATION - H & P REVIEW
79 R-H M with h/o L carotid endarterectomy (~2018), R goiter, HTN, DM, HLD presented to the ED with room spinning dizziness. Today 3/2, patient was watching sports on his chair, when, after using a nasal wash for sinus blockage, developed room spinning dizziness that was worse when he was looking towards the right. He also was using Qtip to clean his ears in the AM as well. Decided to come to the ED. When EMS arrived, he also complained of a faint fluctuating numbness in LLE. CODE STROKE called at 1531. NIHSS 1. mRS 0. CT head demonstrated no acute infarct, hemorrhage, nor mass effect. CT angio redemonstrated a previous R vert occlusion with distal reconstitution and evidence of L carotid endarterectomy. Not candidate for tenecteplase because low NIHSS. Not candidate for thrombectomy because no LVO. Not a smoker and does not use drugs. 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

## 2025-03-04 ENCOUNTER — TRANSCRIPTION ENCOUNTER (OUTPATIENT)
Age: 80
End: 2025-03-04

## 2025-03-04 VITALS — OXYGEN SATURATION: 97 % | DIASTOLIC BLOOD PRESSURE: 90 MMHG | SYSTOLIC BLOOD PRESSURE: 190 MMHG | HEART RATE: 71 BPM

## 2025-03-04 PROBLEM — Z00.00 ENCOUNTER FOR PREVENTIVE HEALTH EXAMINATION: Status: ACTIVE | Noted: 2025-03-04

## 2025-03-04 LAB
GLUCOSE BLDC GLUCOMTR-MCNC: 118 MG/DL — HIGH (ref 70–99)
GLUCOSE BLDC GLUCOMTR-MCNC: 118 MG/DL — HIGH (ref 70–99)

## 2025-03-04 PROCEDURE — 85014 HEMATOCRIT: CPT

## 2025-03-04 PROCEDURE — 70498 CT ANGIOGRAPHY NECK: CPT | Mod: MC

## 2025-03-04 PROCEDURE — 82435 ASSAY OF BLOOD CHLORIDE: CPT

## 2025-03-04 PROCEDURE — 70544 MR ANGIOGRAPHY HEAD W/O DYE: CPT

## 2025-03-04 PROCEDURE — 80053 COMPREHEN METABOLIC PANEL: CPT

## 2025-03-04 PROCEDURE — 85025 COMPLETE CBC W/AUTO DIFF WBC: CPT

## 2025-03-04 PROCEDURE — 85018 HEMOGLOBIN: CPT

## 2025-03-04 PROCEDURE — 84295 ASSAY OF SERUM SODIUM: CPT

## 2025-03-04 PROCEDURE — 36415 COLL VENOUS BLD VENIPUNCTURE: CPT

## 2025-03-04 PROCEDURE — 85730 THROMBOPLASTIN TIME PARTIAL: CPT

## 2025-03-04 PROCEDURE — 83036 HEMOGLOBIN GLYCOSYLATED A1C: CPT

## 2025-03-04 PROCEDURE — 97165 OT EVAL LOW COMPLEX 30 MIN: CPT

## 2025-03-04 PROCEDURE — 97530 THERAPEUTIC ACTIVITIES: CPT

## 2025-03-04 PROCEDURE — 97116 GAIT TRAINING THERAPY: CPT

## 2025-03-04 PROCEDURE — 80061 LIPID PANEL: CPT

## 2025-03-04 PROCEDURE — 70450 CT HEAD/BRAIN W/O DYE: CPT | Mod: MC

## 2025-03-04 PROCEDURE — 84484 ASSAY OF TROPONIN QUANT: CPT

## 2025-03-04 PROCEDURE — A9585: CPT

## 2025-03-04 PROCEDURE — 82330 ASSAY OF CALCIUM: CPT

## 2025-03-04 PROCEDURE — 99285 EMERGENCY DEPT VISIT HI MDM: CPT

## 2025-03-04 PROCEDURE — 83605 ASSAY OF LACTIC ACID: CPT

## 2025-03-04 PROCEDURE — 70553 MRI BRAIN STEM W/O & W/DYE: CPT | Mod: MC

## 2025-03-04 PROCEDURE — 96374 THER/PROPH/DIAG INJ IV PUSH: CPT

## 2025-03-04 PROCEDURE — 97161 PT EVAL LOW COMPLEX 20 MIN: CPT

## 2025-03-04 PROCEDURE — 82962 GLUCOSE BLOOD TEST: CPT

## 2025-03-04 PROCEDURE — 82803 BLOOD GASES ANY COMBINATION: CPT

## 2025-03-04 PROCEDURE — 99291 CRITICAL CARE FIRST HOUR: CPT

## 2025-03-04 PROCEDURE — 76536 US EXAM OF HEAD AND NECK: CPT

## 2025-03-04 PROCEDURE — 0042T: CPT | Mod: MC

## 2025-03-04 PROCEDURE — 81003 URINALYSIS AUTO W/O SCOPE: CPT

## 2025-03-04 PROCEDURE — 70549 MR ANGIOGRAPH NECK W/O&W/DYE: CPT

## 2025-03-04 PROCEDURE — 82947 ASSAY GLUCOSE BLOOD QUANT: CPT

## 2025-03-04 PROCEDURE — 83735 ASSAY OF MAGNESIUM: CPT

## 2025-03-04 PROCEDURE — 93306 TTE W/DOPPLER COMPLETE: CPT

## 2025-03-04 PROCEDURE — 84132 ASSAY OF SERUM POTASSIUM: CPT

## 2025-03-04 PROCEDURE — 85610 PROTHROMBIN TIME: CPT

## 2025-03-04 PROCEDURE — 70496 CT ANGIOGRAPHY HEAD: CPT | Mod: MC

## 2025-03-04 PROCEDURE — 84100 ASSAY OF PHOSPHORUS: CPT

## 2025-03-04 RX ORDER — SODIUM CHLORIDE 9 G/1000ML
1000 INJECTION, SOLUTION INTRAVENOUS
Refills: 0 | Status: DISCONTINUED | OUTPATIENT
Start: 2025-03-04 | End: 2025-03-04

## 2025-03-04 RX ORDER — ATORVASTATIN CALCIUM 80 MG/1
80 TABLET, FILM COATED ORAL AT BEDTIME
Refills: 0 | Status: DISCONTINUED | OUTPATIENT
Start: 2025-03-04 | End: 2025-03-04

## 2025-03-04 RX ORDER — NEBIVOLOL 2.5 MG/1
1 TABLET ORAL
Refills: 0 | DISCHARGE

## 2025-03-04 RX ORDER — ISOPROPYL ALCOHOL, BENZOCAINE .7; .06 ML/ML; ML/ML
0 SWAB TOPICAL
Qty: 100 | Refills: 1
Start: 2025-03-04

## 2025-03-04 RX ORDER — FLUTICASONE PROPIONATE 50 UG/1
1 SPRAY, METERED NASAL
Refills: 0 | DISCHARGE

## 2025-03-04 RX ORDER — ATORVASTATIN CALCIUM 80 MG/1
1 TABLET, FILM COATED ORAL
Qty: 30 | Refills: 0
Start: 2025-03-04 | End: 2025-04-02

## 2025-03-04 RX ORDER — MECLIZINE HCL 12.5 MG
1 TABLET ORAL
Qty: 30 | Refills: 0
Start: 2025-03-04 | End: 2025-04-02

## 2025-03-04 RX ORDER — CLOPIDOGREL BISULFATE 75 MG/1
1 TABLET, FILM COATED ORAL
Qty: 30 | Refills: 0
Start: 2025-03-04 | End: 2025-04-02

## 2025-03-04 RX ADMIN — SODIUM CHLORIDE 85 MILLILITER(S): 9 INJECTION, SOLUTION INTRAVENOUS at 08:30

## 2025-03-04 RX ADMIN — AMLODIPINE BESYLATE 5 MILLIGRAM(S): 10 TABLET ORAL at 05:26

## 2025-03-04 RX ADMIN — Medication 40 MILLIGRAM(S): at 05:24

## 2025-03-04 RX ADMIN — CLOPIDOGREL BISULFATE 75 MILLIGRAM(S): 75 TABLET, FILM COATED ORAL at 11:22

## 2025-03-04 RX ADMIN — ENOXAPARIN SODIUM 40 MILLIGRAM(S): 100 INJECTION SUBCUTANEOUS at 05:24

## 2025-03-04 RX ADMIN — Medication 81 MILLIGRAM(S): at 11:22

## 2025-03-04 NOTE — DISCHARGE NOTE PROVIDER - NSDCCPCAREPLAN_GEN_ALL_CORE_FT
PRINCIPAL DISCHARGE DIAGNOSIS  Diagnosis: Acute cerebral infarction  Assessment and Plan of Treatment: Please follow up with neurologist in 1 week. Follow up with your Cardiologist within the next 1 week regarding Blood Pressure management and possible Loop recorder placement. Continue taking medications as prescribed. Monitor your blood pressure. Reduce fat, cholesterol and salt in your diet. Increase intake of fruits and vegetables. Limit alcohol to minimum and do not smoke. You may be at risk for falling, make changes to your home to help you walk easier. Keep up to date on vaccinations.  If you experience any symptoms of facial drooping, slurred speech, arm or leg weakness, severe headache, vision changes or any worsening symptoms, notify provider immediatley and return to ER.      SECONDARY DISCHARGE DIAGNOSES  Diagnosis: Peripheral vertigo  Assessment and Plan of Treatment:

## 2025-03-04 NOTE — PROGRESS NOTE ADULT - ASSESSMENT
ASSESSMENT:  78 y/o male presents with dizziness, CT shows R vert occlusion, no TNK or MT, low NIHSS. admitted to stroke service    IMPRESSION   Overall stable neurologically. Room spinning dizziness and right beating nystagmus, positive head impulse test, and negative test of skew likely due to peripheral vertigo   LLE numbness, wide differential but considering peripheral neuropathy, less likely ischemic etiology     NEURO: Neuroq4, normotensive, A1C 7.1, LDL - 74, c/w homemed rosuvastatin, MR shows occluded vert, small punctate subacute infarct. Physical therapy/OT/Speech eval/treatment no skilled needs    ANTITHROMBOTIC THERAPY: ASA81/Plavix 75 21 days chance trial, then asa81 daily indefinitely    PULMONARY: on RA, protecting airway, saturating well     CARDIOVASCULAR: TTE 66%, no PFO, cardiac monitoring                              SBP goal: 100-160    GASTROINTESTINAL:        Diet: CCD    RENAL: Cr elevated, started on LR 85cc 24 hours, f/u BMP in am vs d/c and f/u with PCP      Na Goal: Greater than 135     Oconnor: none    HEMATOLOGY: H/H without change, Platelets normal      DVT ppx: Heparin s.c [] LMWH [x]     ID: afebrile, no leukocytosis     OTHER: Plan discussed with patient and family at bedside, all questions and concerns addressed.     DISPOSITION: Most likely d/c home today    CORE MEASURES:        Admission NIHSS: 1     Tenecteplase: [] YES [x] NO      LDL/HDL: 74/42     Depression Screen: passed     Statin Therapy: Y     Dysphagia Screen: [x] PASS [] FAIL     Smoking [] YES [x] NO [] Smoking cessation and education provided to patient [] Nicotine patch ordered      Afib [] YES [x] NO     Stroke Education [] YES [x] NO    Obtain screening lower extremity venous ultrasound in patients who meet 1 or more of the following criteria as patient is high risk for DVT/PE on admission:   [] History of DVT/PE  []Hypercoagulable states (Factor V Leiden, Cancer, OCP, etc. )  []Prolonged immobility (hemiplegia/hemiparesis/post operative or any other extended immobilization)  [] Transferred from outside facility (Rehab or Long term care)  [] Age </= to 50 ASSESSMENT:  78 y/o male presents with dizziness, CT shows R vert occlusion, no TNK or MT, low NIHSS. admitted to stroke service    IMPRESSION   Overall stable neurologically. Room spinning dizziness and right beating nystagmus, positive head impulse test, and negative test of skew likely due to peripheral vertigo   LLE numbness, wide differential but considering peripheral neuropathy, less likely ischemic etiology     NEURO: Neuroq4, normotensive, A1C 7.1, LDL - 74, increased atorvastatin 80 in setting of acute stroke for goal <70. MR shows occluded vert, small punctate subacute infarct. Physical therapy/OT/Speech eval/treatment no skilled needs    ANTITHROMBOTIC THERAPY: ASA81/Plavix 75 21 days chance trial, then asa81 daily indefinitely    PULMONARY: on RA, protecting airway, saturating well     CARDIOVASCULAR: TTE 66%, no PFO, cardiac monitoring                              SBP goal: 100-160    GASTROINTESTINAL:        Diet: CCD    RENAL: Cr elevated, started on LR 85cc 24 hours, f/u BMP in am vs d/c and f/u with PCP      Na Goal: Greater than 135     Oconnor: none    HEMATOLOGY: H/H without change, Platelets normal      DVT ppx: Heparin s.c [] LMWH [x]     ID: afebrile, no leukocytosis     OTHER: Plan discussed with patient and family at bedside, all questions and concerns addressed.     DISPOSITION: Most likely d/c home today    CORE MEASURES:        Admission NIHSS: 1     Tenecteplase: [] YES [x] NO      LDL/HDL: 74/42     Depression Screen: passed     Statin Therapy: Y     Dysphagia Screen: [x] PASS [] FAIL     Smoking [] YES [x] NO [] Smoking cessation and education provided to patient [] Nicotine patch ordered      Afib [] YES [x] NO     Stroke Education [] YES [x] NO    Obtain screening lower extremity venous ultrasound in patients who meet 1 or more of the following criteria as patient is high risk for DVT/PE on admission:   [] History of DVT/PE  []Hypercoagulable states (Factor V Leiden, Cancer, OCP, etc. )  []Prolonged immobility (hemiplegia/hemiparesis/post operative or any other extended immobilization)  [] Transferred from outside facility (Rehab or Long term care)  [] Age </= to 50

## 2025-03-04 NOTE — DISCHARGE NOTE PROVIDER - NSDCMRMEDTOKEN_GEN_ALL_CORE_FT
acetaminophen 325 mg oral tablet: 3 tab(s) orally every 6 hours, As needed, Mild Pain (1 - 3), Moderate Pain (4 - 6)  amlodipine-olmesartan 10 mg-40 mg oral tablet: 1 tab(s) orally once a day  aspirin 81 mg oral tablet: 1 tab(s) orally once a day  Flonase 50 mcg/inh nasal spray: 1 spray(s) in each nostril once a day as needed  hydrALAZINE 50 mg oral tablet: 1 tab(s) orally 2 times a day  hydroCHLOROthiazide 25 mg oral tablet: 1 tab(s) orally once a day  metFORMIN 500 mg oral tablet: 1 tab(s) orally 2 times a day  Nebivolol 10 mg oral tablet: 1 tab(s) orally once a day  pantoprazole 40 mg oral delayed release tablet: 1 tab(s) orally once a day  rosuvastatin 20 mg oral tablet: 1 tab(s) orally once a day  vardenafil 20 mg oral tablet: 1 tab(s) orally once a day as needed   amlodipine-olmesartan 10 mg-40 mg oral tablet: 1 tab(s) orally once a day  aspirin 81 mg oral tablet: 1 tab(s) orally once a day  Flonase 50 mcg/inh nasal spray: 1 spray(s) in each nostril once a day as needed  hydrALAZINE 50 mg oral tablet: 1 tab(s) orally 2 times a day  hydroCHLOROthiazide 25 mg oral tablet: 1 tab(s) orally once a day  meclizine 25 mg oral tablet: 1 tab(s) orally every 8 hours as needed for Dizziness  metFORMIN 500 mg oral tablet: 1 tab(s) orally 2 times a day  Nebivolol 10 mg oral tablet: 1 tab(s) orally once a day  pantoprazole 40 mg oral delayed release tablet: 1 tab(s) orally once a day  rosuvastatin 20 mg oral tablet: 1 tab(s) orally once a day  vardenafil 20 mg oral tablet: 1 tab(s) orally once a day as needed   amlodipine-olmesartan 10 mg-40 mg oral tablet: 1 tab(s) orally once a day  aspirin 81 mg oral tablet: 1 tab(s) orally once a day  atorvastatin 80 mg oral tablet: 1 tab(s) orally once a day (at bedtime)  clopidogrel 75 mg oral tablet: 1 tab(s) orally once a day  hydroCHLOROthiazide 25 mg oral tablet: 1 tab(s) orally once a day  meclizine 25 mg oral tablet: 1 tab(s) orally every 8 hours as needed for Dizziness  metFORMIN 500 mg oral tablet: 1 tab(s) orally 2 times a day  pantoprazole 40 mg oral delayed release tablet: 1 tab(s) orally once a day  vardenafil 20 mg oral tablet: 1 tab(s) orally once a day as needed   alcohol swabs: Apply topically to affected area 4 times a day  amlodipine-olmesartan 10 mg-40 mg oral tablet: 1 tab(s) orally once a day  aspirin 81 mg oral tablet: 1 tab(s) orally once a day  atorvastatin 80 mg oral tablet: 1 tab(s) orally once a day (at bedtime)  clopidogrel 75 mg oral tablet: 1 tab(s) orally once a day  Freestyle Precision Brandon Strips: Test blood sugar four times a day when you see check blood glucose symbol or when symptoms don&#x27;t match Ramírez reading.  glucometer (per patient&#x27;s insurance): Test blood sugars four times a day. Dispense #1 glucometer.  hydrALAZINE 50 mg oral tablet: 50 milligram(s) orally 2 times a day  hydroCHLOROthiazide 25 mg oral tablet: 1 tab(s) orally once a day  lancets: 1 application subcutaneously 4 times a day  meclizine 25 mg oral tablet: 1 tab(s) orally every 8 hours as needed for Dizziness  metFORMIN 500 mg oral tablet: 1 tab(s) orally 2 times a day  pantoprazole 40 mg oral delayed release tablet: 1 tab(s) orally once a day  vardenafil 20 mg oral tablet: 1 tab(s) orally once a day as needed

## 2025-03-04 NOTE — PROGRESS NOTE ADULT - NS ATTEND AMEND GEN_ALL_CORE FT
Thirty five minutes of discharge time was spent on patient exam and discussion including test results, pathophysiology, natural history, stroke risk factors, medication changes and secondary prophylaxis and importance of medication compliance. We also discussed follow up plan. This was discussed with patient/family, who expresses understanding. Resume amlodipine olmesartan 10/40, HCTZ, Hydralazine. Pt to resume nebivolol at home if bp consistently >140. (here has not needed everything). F/up with own cardiologist for bp med regimen and loop. Pt instructed to keep bp diary. ECHO 66%, no WMA, LA mildly dilated.

## 2025-03-04 NOTE — DISCHARGE NOTE PROVIDER - CARE PROVIDER_API CALL
Sara Juares  NP in Family Health  [address] [facility], [address]  [place] [address]  Phone: [phone]  Fax: [phone]  Follow Up Time: Routine

## 2025-03-04 NOTE — DISCHARGE NOTE NURSING/CASE MANAGEMENT/SOCIAL WORK - NSDCPEFALRISK_GEN_ALL_CORE
For information on Fall & Injury Prevention, visit: [email or web address] OR  [email or web address] OR  https://www.cdc.gov/steadi/patient.html

## 2025-03-04 NOTE — DISCHARGE NOTE NURSING/CASE MANAGEMENT/SOCIAL WORK - FINANCIAL ASSISTANCE
[facility] provides services at a reduced cost to those who are determined to be eligible through [facility]’s financial assistance program. Information regarding [facility]’s financial assistance program can be found by going to [email or web address] or by calling [phone].

## 2025-03-04 NOTE — DISCHARGE NOTE PROVIDER - NSDCFUSCHEDAPPT_GEN_ALL_CORE_FT
[facility]  NEUROLOGY [address] [facility]  Scheduled Appointment: 03/11/2025

## 2025-03-04 NOTE — DISCHARGE NOTE PROVIDER - HOSPITAL COURSE
79 R-H M with h/o L carotid endarterectomy (~2018), R goiter, HTN, DM, HLD presented to the ED with room spinning dizziness. Today 3/2, patient was watching sports on his chair, when, after using a nasal wash for sinus blockage, developed room spinning dizziness that was worse when he was looking towards the right. He also was using Qtip to clean his ears in the AM as well. Decided to come to the ED. When EMS arrived, he also complained of a faint fluctuating numbness in LLE. CODE STROKE called at 1531. NIHSS 1. mRS 0. CT head demonstrated no acute infarct, hemorrhage, nor mass effect. CT angio redemonstrated a previous R vert occlusion with distal reconstitution and evidence of L carotid endarterectomy. Not candidate for tenecteplase because low NIHSS. Not candidate for thrombectomy because no LVO.        Room spinning dizziness and right beating nystagmus, positive head impulse test, and negative test of skew likely due to peripheral vertigo   LLE numbness, wide differential but considering peripheral neuropathy, less likely ischemic etiology   MRI of brain performed   MR Head w/wo IV Cont (03.03.25 @ 12:51)     MRI BRAIN:  1. Small acute/subacute infarct within the left posterior parietal   periventricular white matter with associated cytotoxic edema.  2. Multiple additional patchy confluent nonspecific abnormal white matter   foci of T2/FLAIR prolongation statistically favoring microvascular type   changes.  3. No abnormal intracranial enhancement.  4. 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    MRA NECK:  1. 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  2. Otherwise, no large vessel occlusion or major stenosis.  2. Redemonstration of a large right-sided thyroid mass. Recommend   ultrasound correlation and/or ultrasound-guided tissue sampling as   thyroid neoplasm is a consideration.    MRA HEAD:  1. Occluded right-sided V4 segment which appears unchanged.  2. Otherwise, no large vessel occlusion or major stenosis.    TTE W or WO Ultrasound Enhancing Agent (03.03.25 @ 10:01)     CONCLUSIONS:   1. Left ventricular cavity is normal in size. Left ventricular systolic function is normal with an ejection fraction of 66 % by Gallegos's method of disks. There are no regional wall motion abnormalities seen.   2. There is moderate (grade 2) left ventricular diastolic dysfunction, with normal left ventricular filling pressure.   3. Mildly enlarged right ventricular cavity size, with normal wall thickness, and normal right ventricular systolic function.   4. The right atrium is mildly dilated.   5. No pericardial effusion seen.   6. Mild to moderate tricuspid regurgitation.   7. Estimated pulmonary artery systolic pressure is 60 mmHg.   8. No prior echocardiogram is available for comparison.        MRI of Brain, MRA Head and Neck performed and showing acute infarct as above, then admitted to stroke service.   TTE performed  PT/OT evaluated patient deemed to have no skilled PT/OT needs  Kept on Home ASA, Atorvastatin, Meclizine PRN  Lipid Panel performed LDL 74, home dose Crestor stopped, switched to Atorvastatin 80mg PO qHs   Patient will need outpatient Vestibular Exercises     79 R-H M with h/o L carotid endarterectomy (~2018), R goiter, HTN, DM, HLD presented to the ED with room spinning dizziness. Today 3/2, patient was watching sports on his chair, when, after using a nasal wash for sinus blockage, developed room spinning dizziness that was worse when he was looking towards the right. He also was using Qtip to clean his ears in the AM as well. Decided to come to the ED. When EMS arrived, he also complained of a faint fluctuating numbness in LLE. CODE STROKE called at 1531. NIHSS 1. mRS 0. CT head demonstrated no acute infarct, hemorrhage, nor mass effect. CT angio redemonstrated a previous R vert occlusion with distal reconstitution and evidence of L carotid endarterectomy. Not candidate for tenecteplase because low NIHSS. Not candidate for thrombectomy because no LVO.        Room spinning dizziness and right beating nystagmus, positive head impulse test, and negative test of skew likely due to peripheral vertigo   LLE numbness, wide differential but considering peripheral neuropathy, less likely ischemic etiology   MRI of brain performed   MR Head w/wo IV Cont (03.03.25 @ 12:51)     MRI BRAIN:  1. Small acute/subacute infarct within the left posterior parietal   periventricular white matter with associated cytotoxic edema.  2. Multiple additional patchy confluent nonspecific abnormal white matter   foci of T2/FLAIR prolongation statistically favoring microvascular type   changes.  3. No abnormal intracranial enhancement.  4. 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    MRA NECK:  1. 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  2. Otherwise, no large vessel occlusion or major stenosis.  2. Redemonstration of a large right-sided thyroid mass. Recommend   ultrasound correlation and/or ultrasound-guided tissue sampling as   thyroid neoplasm is a consideration.    MRA HEAD:  1. Occluded right-sided V4 segment which appears unchanged.  2. Otherwise, no large vessel occlusion or major stenosis.    TTE W or WO Ultrasound Enhancing Agent (03.03.25 @ 10:01)     CONCLUSIONS:   1. Left ventricular cavity is normal in size. Left ventricular systolic function is normal with an ejection fraction of 66 % by Gallegos's method of disks. There are no regional wall motion abnormalities seen.   2. There is moderate (grade 2) left ventricular diastolic dysfunction, with normal left ventricular filling pressure.   3. Mildly enlarged right ventricular cavity size, with normal wall thickness, and normal right ventricular systolic function.   4. The right atrium is mildly dilated.   5. No pericardial effusion seen.   6. Mild to moderate tricuspid regurgitation.   7. Estimated pulmonary artery systolic pressure is 60 mmHg.   8. No prior echocardiogram is available for comparison.        MRI of Brain, MRA Head and Neck performed and showing acute infarct as above, then admitted to stroke service.   TTE performed  PT/OT evaluated patient deemed to have no skilled PT/OT needs  Continue ASA81 daily, and in addition take plavix 75mg daily for 21 days and after 21 days only take aspirin 81 daily until follow up with neurologist.  Atorvastatin, Meclizine PRN  Lipid Panel performed LDL 74, home dose Crestor stopped, switched to Atorvastatin 80mg PO qHs   Patient will need outpatient Vestibular Exercises

## 2025-03-04 NOTE — DISCHARGE NOTE NURSING/CASE MANAGEMENT/SOCIAL WORK - PATIENT PORTAL LINK FT
You can access the FollowMyHealth Patient Portal offered by [facility] by registering at the following website: [email or web address]. By joining [employer]’s FollowMyHealth portal, you will also be able to view your health information using other applications (apps) compatible with our system.

## 2025-03-06 ENCOUNTER — NON-APPOINTMENT (OUTPATIENT)
Age: 80
End: 2025-03-06

## 2025-03-11 ENCOUNTER — APPOINTMENT (OUTPATIENT)
Dept: NEUROLOGY | Facility: CLINIC | Age: 80
End: 2025-03-11
Payer: MEDICARE

## 2025-03-11 ENCOUNTER — NON-APPOINTMENT (OUTPATIENT)
Age: 80
End: 2025-03-11

## 2025-03-11 VITALS
WEIGHT: 230 LBS | HEART RATE: 88 BPM | SYSTOLIC BLOOD PRESSURE: 201 MMHG | DIASTOLIC BLOOD PRESSURE: 86 MMHG | BODY MASS INDEX: 31.15 KG/M2 | HEIGHT: 72 IN

## 2025-03-11 DIAGNOSIS — I63.9 CEREBRAL INFARCTION, UNSPECIFIED: ICD-10-CM

## 2025-03-11 DIAGNOSIS — I77.9 DISORDER OF ARTERIES AND ARTERIOLES, UNSPECIFIED: ICD-10-CM

## 2025-03-11 DIAGNOSIS — I65.01 OCCLUSION AND STENOSIS OF RIGHT VERTEBRAL ARTERY: ICD-10-CM

## 2025-03-11 PROCEDURE — 99205 OFFICE O/P NEW HI 60 MIN: CPT

## 2025-03-11 PROCEDURE — G2211 COMPLEX E/M VISIT ADD ON: CPT

## 2025-03-11 RX ORDER — PANTOPRAZOLE 20 MG/1
20 TABLET, DELAYED RELEASE ORAL
Refills: 0 | Status: ACTIVE | COMMUNITY

## 2025-03-11 RX ORDER — AMLODIPINE BESYLATE 5 MG/1
TABLET ORAL
Refills: 0 | Status: ACTIVE | COMMUNITY

## 2025-03-11 RX ORDER — HYDRALAZINE HYDROCHLORIDE 50 MG/1
TABLET ORAL
Refills: 0 | Status: ACTIVE | COMMUNITY

## 2025-03-11 RX ORDER — CLOPIDOGREL BISULFATE 300 MG/1
TABLET, FILM COATED ORAL
Refills: 0 | Status: ACTIVE | COMMUNITY

## 2025-03-11 RX ORDER — HYDROCHLOROTHIAZIDE 12.5 MG/1
TABLET ORAL
Refills: 0 | Status: ACTIVE | COMMUNITY

## 2025-03-11 RX ORDER — METFORMIN HYDROCHLORIDE 500 MG/1
500 TABLET, COATED ORAL
Refills: 0 | Status: ACTIVE | COMMUNITY

## 2025-03-11 RX ORDER — ASPIRIN 81 MG
81 TABLET, DELAYED RELEASE (ENTERIC COATED) ORAL
Refills: 0 | Status: ACTIVE | COMMUNITY

## 2025-03-11 RX ORDER — ATORVASTATIN CALCIUM 80 MG/1
80 TABLET, FILM COATED ORAL
Refills: 0 | Status: ACTIVE | COMMUNITY

## 2025-07-02 ENCOUNTER — APPOINTMENT (OUTPATIENT)
Dept: NEUROLOGY | Facility: CLINIC | Age: 80
End: 2025-07-02

## 2025-07-07 ENCOUNTER — APPOINTMENT (OUTPATIENT)
Dept: NEUROLOGY | Facility: CLINIC | Age: 80
End: 2025-07-07

## 2025-08-06 ENCOUNTER — APPOINTMENT (OUTPATIENT)
Dept: NEUROLOGY | Facility: CLINIC | Age: 80
End: 2025-08-06
Payer: MEDICARE

## 2025-08-06 VITALS
DIASTOLIC BLOOD PRESSURE: 60 MMHG | WEIGHT: 235 LBS | OXYGEN SATURATION: 98 % | RESPIRATION RATE: 18 BRPM | HEIGHT: 72 IN | BODY MASS INDEX: 31.83 KG/M2 | SYSTOLIC BLOOD PRESSURE: 140 MMHG | HEART RATE: 72 BPM

## 2025-08-06 DIAGNOSIS — I63.9 CEREBRAL INFARCTION, UNSPECIFIED: ICD-10-CM

## 2025-08-06 DIAGNOSIS — R42 DIZZINESS AND GIDDINESS: ICD-10-CM

## 2025-08-06 DIAGNOSIS — I77.9 DISORDER OF ARTERIES AND ARTERIOLES, UNSPECIFIED: ICD-10-CM

## 2025-08-06 DIAGNOSIS — I65.01 OCCLUSION AND STENOSIS OF RIGHT VERTEBRAL ARTERY: ICD-10-CM

## 2025-08-06 PROCEDURE — G2211 COMPLEX E/M VISIT ADD ON: CPT

## 2025-08-06 PROCEDURE — 99214 OFFICE O/P EST MOD 30 MIN: CPT
